# Patient Record
Sex: MALE | Race: WHITE | Employment: OTHER | URBAN - NONMETROPOLITAN AREA
[De-identification: names, ages, dates, MRNs, and addresses within clinical notes are randomized per-mention and may not be internally consistent; named-entity substitution may affect disease eponyms.]

---

## 2017-05-03 ENCOUNTER — PROCEDURE VISIT (OUTPATIENT)
Dept: PRIMARY CARE CLINIC | Age: 82
End: 2017-05-03
Payer: COMMERCIAL

## 2017-05-03 DIAGNOSIS — Z23 NEED FOR PNEUMOCOCCAL VACCINATION: Primary | ICD-10-CM

## 2017-05-03 PROCEDURE — 90471 IMMUNIZATION ADMIN: CPT | Performed by: PEDIATRICS

## 2017-05-03 PROCEDURE — 90732 PPSV23 VACC 2 YRS+ SUBQ/IM: CPT | Performed by: PEDIATRICS

## 2017-05-24 DIAGNOSIS — E11.9 TYPE 2 DIABETES MELLITUS WITHOUT COMPLICATION, UNSPECIFIED LONG TERM INSULIN USE STATUS: ICD-10-CM

## 2017-10-30 ENCOUNTER — PROCEDURE VISIT (OUTPATIENT)
Dept: PRIMARY CARE CLINIC | Age: 82
End: 2017-10-30
Payer: COMMERCIAL

## 2017-10-30 DIAGNOSIS — Z23 NEED FOR INFLUENZA VACCINATION: Primary | ICD-10-CM

## 2017-10-30 PROCEDURE — 90471 IMMUNIZATION ADMIN: CPT | Performed by: PEDIATRICS

## 2017-10-30 PROCEDURE — 90662 IIV NO PRSV INCREASED AG IM: CPT | Performed by: PEDIATRICS

## 2017-11-13 DIAGNOSIS — E78.00 PURE HYPERCHOLESTEROLEMIA: ICD-10-CM

## 2017-11-13 DIAGNOSIS — Z79.4 TYPE 2 DIABETES MELLITUS WITHOUT COMPLICATION, WITH LONG-TERM CURRENT USE OF INSULIN (HCC): ICD-10-CM

## 2017-11-13 DIAGNOSIS — E11.9 TYPE 2 DIABETES MELLITUS WITHOUT COMPLICATION, WITH LONG-TERM CURRENT USE OF INSULIN (HCC): ICD-10-CM

## 2017-11-14 ENCOUNTER — OFFICE VISIT (OUTPATIENT)
Dept: PRIMARY CARE CLINIC | Age: 82
End: 2017-11-14
Payer: COMMERCIAL

## 2017-11-14 VITALS
OXYGEN SATURATION: 98 % | DIASTOLIC BLOOD PRESSURE: 70 MMHG | HEIGHT: 71 IN | WEIGHT: 166 LBS | HEART RATE: 70 BPM | SYSTOLIC BLOOD PRESSURE: 130 MMHG | TEMPERATURE: 97.6 F | BODY MASS INDEX: 23.24 KG/M2

## 2017-11-14 DIAGNOSIS — E78.00 PURE HYPERCHOLESTEROLEMIA: ICD-10-CM

## 2017-11-14 DIAGNOSIS — I10 ESSENTIAL HYPERTENSION: ICD-10-CM

## 2017-11-14 DIAGNOSIS — I25.10 CORONARY ARTERY DISEASE INVOLVING NATIVE HEART WITHOUT ANGINA PECTORIS, UNSPECIFIED VESSEL OR LESION TYPE: ICD-10-CM

## 2017-11-14 DIAGNOSIS — Z12.5 SCREENING PSA (PROSTATE SPECIFIC ANTIGEN): ICD-10-CM

## 2017-11-14 DIAGNOSIS — E11.9 TYPE 2 DIABETES MELLITUS WITHOUT COMPLICATION, UNSPECIFIED LONG TERM INSULIN USE STATUS: Primary | ICD-10-CM

## 2017-11-14 PROCEDURE — 1123F ACP DISCUSS/DSCN MKR DOCD: CPT | Performed by: NURSE PRACTITIONER

## 2017-11-14 PROCEDURE — G8598 ASA/ANTIPLAT THER USED: HCPCS | Performed by: NURSE PRACTITIONER

## 2017-11-14 PROCEDURE — G8427 DOCREV CUR MEDS BY ELIG CLIN: HCPCS | Performed by: NURSE PRACTITIONER

## 2017-11-14 PROCEDURE — 4040F PNEUMOC VAC/ADMIN/RCVD: CPT | Performed by: NURSE PRACTITIONER

## 2017-11-14 PROCEDURE — G8420 CALC BMI NORM PARAMETERS: HCPCS | Performed by: NURSE PRACTITIONER

## 2017-11-14 PROCEDURE — G8484 FLU IMMUNIZE NO ADMIN: HCPCS | Performed by: NURSE PRACTITIONER

## 2017-11-14 PROCEDURE — 99214 OFFICE O/P EST MOD 30 MIN: CPT | Performed by: NURSE PRACTITIONER

## 2017-11-14 PROCEDURE — 1036F TOBACCO NON-USER: CPT | Performed by: NURSE PRACTITIONER

## 2017-11-14 RX ORDER — CHOLECALCIFEROL (VITAMIN D3) 125 MCG
500 CAPSULE ORAL DAILY
COMMUNITY

## 2017-11-14 RX ORDER — ATORVASTATIN CALCIUM 40 MG/1
40 TABLET, FILM COATED ORAL DAILY
Qty: 90 TABLET | Refills: 3 | Status: SHIPPED | OUTPATIENT
Start: 2017-11-14 | End: 2018-11-29 | Stop reason: SDUPTHER

## 2017-11-14 RX ORDER — CLOPIDOGREL BISULFATE 75 MG/1
TABLET ORAL
Qty: 90 TABLET | Refills: 3 | Status: SHIPPED | OUTPATIENT
Start: 2017-11-14 | End: 2018-12-13 | Stop reason: SDUPTHER

## 2017-11-14 RX ORDER — IRBESARTAN 300 MG/1
300 TABLET ORAL DAILY
Qty: 90 TABLET | Refills: 3 | Status: SHIPPED | OUTPATIENT
Start: 2017-11-14 | End: 2018-12-21 | Stop reason: SDUPTHER

## 2017-11-14 RX ORDER — METOPROLOL SUCCINATE 100 MG/1
100 TABLET, EXTENDED RELEASE ORAL DAILY
Qty: 90 TABLET | Refills: 3 | Status: SHIPPED | OUTPATIENT
Start: 2017-11-14 | End: 2018-12-13 | Stop reason: SDUPTHER

## 2017-11-14 NOTE — PROGRESS NOTES
[address]  [place], [address] [place] [address] Rd  Phone [phone]   Fax [phone]      OFFICE VISIT: 11/14/2017    Abdoul Brunson is a [de-identified] y.o. male who presents today for his medical conditions/complaints as noted below. Abdoul Brunson is c/o of Annual Exam; Medication Refill; Hyperlipidemia; Hypertension; and Diabetes        HPI:     HPI  Treatment Adherence:   Medication compliance:  compliant most of the time  Diet compliance:  compliant most of the time  Weight trend: stable  Current exercise: no regular exercise  Barriers: none    Diabetes Mellitus Type 2:     Home blood sugar records: fasting range: 100  Any episodes of hypoglycemia? no  Eye exam current (within one year): yes  Tobacco history: He  reports that he has quit smoking. He has never used smokeless tobacco.   Daily Aspirin? Yes      Hypertension:  Home blood pressure monitoring: No.  He is adherent to a low sodium diet. Patient denies chest pain. Antihypertensive medication side effects: no medication side effects noted. Use of agents associated with hypertension: none. Hyperlipidemia:  No new myalgias or GI upset on atorvastatin (Lipitor).        Lab Results   Component Value Date    LABA1C 6.1 11/07/2016    LABA1C 6.4 (H) 09/24/2015     Lab Results   Component Value Date    CREATININE 1.3 11/07/2016     Lab Results   Component Value Date    ALT 29 11/07/2016    AST 25 11/07/2016     Lab Results   Component Value Date    TRIG 116 11/07/2016    HDL 49 11/07/2016    LDLCALC 62 11/07/2016    LDLDIRECT 74 09/24/2015                 Past Medical History:   Diagnosis Date    CAD (coronary artery disease)     COPD (chronic obstructive pulmonary disease) (HCC)     Hyperlipidemia     Hypertension     Type II or unspecified type diabetes mellitus without mention of complication, not stated as uncontrolled       Past Surgical History:   Procedure Laterality Date    CATARACT REMOVAL      CORONARY ARTERY BYPASS GRAFT      CYST REMOVAL      from Cardiovascular: Negative for chest pain, palpitations and leg swelling. Gastrointestinal: Negative for abdominal pain, constipation, diarrhea, nausea and vomiting. Genitourinary: Negative for flank pain. Musculoskeletal: Negative for arthralgias, myalgias, neck pain and neck stiffness. Neurological: Negative for headaches. Psychiatric/Behavioral: Negative for decreased concentration and sleep disturbance. The patient is not nervous/anxious. Objective:     Physical Exam   Constitutional: He is oriented to person, place, and time. He appears well-developed and well-nourished. HENT:   Head: Normocephalic and atraumatic. Right Ear: External ear normal.   Left Ear: External ear normal.   Nose: Nose normal.   Mouth/Throat: Oropharynx is clear and moist.   Eyes: Conjunctivae are normal. Pupils are equal, round, and reactive to light. No scleral icterus. Neck: Normal range of motion. Neck supple. No edema present. Cardiovascular: Normal rate, regular rhythm, normal heart sounds and intact distal pulses. No murmur heard. Pulmonary/Chest: Effort normal and breath sounds normal.   Abdominal: Soft. Normal appearance and bowel sounds are normal. He exhibits no distension. There is no hepatosplenomegaly. There is no tenderness. Musculoskeletal: Normal range of motion. He exhibits no edema. Neurological: He is alert and oriented to person, place, and time. Skin: Skin is warm, dry and intact. No rash noted. Psychiatric: He has a normal mood and affect. His speech is normal and behavior is normal. Judgment and thought content normal.   Vitals reviewed. /70   Pulse 70   Temp 97.6 °F (36.4 °C) (Temporal)   Ht 5' 11\" (1.803 m)   Wt 166 lb (75.3 kg)   SpO2 98%   BMI 23.15 kg/m²     Assessment:        ICD-10-CM ICD-9-CM    1.  Type 2 diabetes mellitus without complication, unspecified long term insulin use status (HCC) E11.9 250.00 sitaGLIPtan-metformin (JANUMET)  MG per tablet CBC Auto Differential      Comprehensive Metabolic Panel      Hemoglobin A1C      Lipid Panel      TSH without Reflex      T4, Free      Microalbumin / Creatinine Urine Ratio   2. Pure hypercholesterolemia E78.00 272.0 atorvastatin (LIPITOR) 40 MG tablet   3. Screening PSA (prostate specific antigen) Z12.5 V76.44 PSA Screening   4. Essential hypertension I10 401.9 metoprolol succinate (TOPROL XL) 100 MG extended release tablet      irbesartan (AVAPRO) 300 MG tablet   5. Coronary artery disease involving native heart without angina pectoris, unspecified vessel or lesion type I25.10 414.01 clopidogrel (PLAVIX) 75 MG tablet       Plan:      Return in about 6 months (around 5/14/2018) for diabetes.     Orders Placed This Encounter   Procedures    CBC Auto Differential     Standing Status:   Future     Standing Expiration Date:   11/14/2018    Comprehensive Metabolic Panel     Standing Status:   Future     Standing Expiration Date:   11/14/2018    Hemoglobin A1C     Standing Status:   Future     Standing Expiration Date:   11/14/2018    Lipid Panel     Standing Status:   Future     Standing Expiration Date:   11/14/2018     Order Specific Question:   Is Patient Fasting?/# of Hours     Answer:   10-12 hours    TSH without Reflex     Standing Status:   Future     Standing Expiration Date:   11/14/2018    T4, Free     Standing Status:   Future     Standing Expiration Date:   11/14/2018    Microalbumin / Creatinine Urine Ratio     Standing Status:   Future     Standing Expiration Date:   11/15/2018    PSA Screening     Standing Status:   Future     Standing Expiration Date:   11/14/2018     Orders Placed This Encounter   Medications    sitaGLIPtan-metformin (JANUMET)  MG per tablet     Sig: Take 1 tablet by mouth 2 times daily (with meals)     Dispense:  180 tablet     Refill:  3    metoprolol succinate (TOPROL XL) 100 MG extended release tablet     Sig: Take 1 tablet by mouth daily     Dispense:  90

## 2017-11-16 ASSESSMENT — ENCOUNTER SYMPTOMS
DIARRHEA: 0
SHORTNESS OF BREATH: 0
NAUSEA: 0
ABDOMINAL PAIN: 0
COUGH: 0
VOMITING: 0
RHINORRHEA: 0
SORE THROAT: 0
TROUBLE SWALLOWING: 0
CONSTIPATION: 0

## 2017-12-05 ENCOUNTER — TELEPHONE (OUTPATIENT)
Dept: PRIMARY CARE CLINIC | Age: 82
End: 2017-12-05

## 2017-12-05 NOTE — TELEPHONE ENCOUNTER
Wife called, he had labs done last month and never got the results from [facility]. Called Stefani and she is going to fax them over.

## 2017-12-07 ENCOUNTER — TELEPHONE (OUTPATIENT)
Dept: PRIMARY CARE CLINIC | Age: 82
End: 2017-12-07

## 2017-12-07 LAB
ALBUMIN SERPL-MCNC: 3.9 G/DL
ALP BLD-CCNC: 73 U/L
ALT SERPL-CCNC: 23 U/L
ANION GAP SERPL CALCULATED.3IONS-SCNC: 10 MMOL/L
AST SERPL-CCNC: 16 U/L
AVERAGE GLUCOSE: 137
BASOPHILS ABSOLUTE: 0.1 /ΜL
BASOPHILS RELATIVE PERCENT: 0.7 %
BILIRUB SERPL-MCNC: 0.4 MG/DL (ref 0.1–1.4)
BUN BLDV-MCNC: 17 MG/DL
CALCIUM SERPL-MCNC: 8.9 MG/DL
CHLORIDE BLD-SCNC: 103 MMOL/L
CHOLESTEROL, TOTAL: 144 MG/DL
CHOLESTEROL/HDL RATIO: 1
CO2: 26.3 MMOL/L
CREAT SERPL-MCNC: 1.3 MG/DL
EOSINOPHILS ABSOLUTE: 0.3 /ΜL
EOSINOPHILS RELATIVE PERCENT: 3.7 %
GFR CALCULATED: 53
GLUCOSE BLD-MCNC: 119 MG/DL
HBA1C MFR BLD: 6.4 %
HCT VFR BLD CALC: 45.4 % (ref 41–53)
HDLC SERPL-MCNC: 45 MG/DL (ref 35–70)
HEMOGLOBIN: 15 G/DL (ref 13.5–17.5)
LDL CHOLESTEROL CALCULATED: 69 MG/DL (ref 0–160)
LYMPHOCYTES ABSOLUTE: 1.7 /ΜL
LYMPHOCYTES RELATIVE PERCENT: 19.3 %
MCH RBC QN AUTO: 33 PG
MCHC RBC AUTO-ENTMCNC: 33 G/DL
MCV RBC AUTO: 99.8 FL
MONOCYTES ABSOLUTE: 0.7 /ΜL
MONOCYTES RELATIVE PERCENT: 8.2 %
NEUTROPHILS ABSOLUTE: 5.9 /ΜL
NEUTROPHILS RELATIVE PERCENT: 68.1 %
PDW BLD-RTO: 12.3 %
PLATELET # BLD: 187 K/ΜL
PMV BLD AUTO: 10.5 FL
POTASSIUM SERPL-SCNC: 4.4 MMOL/L
PROSTATE SPECIFIC ANTIGEN: 0.9 NG/ML
RBC # BLD: 4.55 10^6/ΜL
SODIUM BLD-SCNC: 139 MMOL/L
T4 FREE: 1.01
TOTAL PROTEIN: 7.4
TRIGL SERPL-MCNC: 152 MG/DL
TSH SERPL DL<=0.05 MIU/L-ACNC: 1.78 UIU/ML
VLDLC SERPL CALC-MCNC: 30 MG/DL
WBC # BLD: 8.7 10^3/ML

## 2017-12-11 ENCOUNTER — TELEPHONE (OUTPATIENT)
Dept: PRIMARY CARE CLINIC | Age: 82
End: 2017-12-11

## 2017-12-11 NOTE — TELEPHONE ENCOUNTER
----- Message from J LUIS Boles sent at 12/8/2017  7:51 AM CST -----  Hemoglobin A1c, three-month diabetes blood sugar average, is 6.4. This means on average her blood sugar has been 137 over the previous 3 months. Overall good control of blood sugar. Continue current regimen. Cholesterol: Within normal limits. Continue Lipitor  PSA, prostate antigen, is within normal limits.  Recommend repeat PSA in 1 year unless symptoms arise

## 2018-01-04 ENCOUNTER — OFFICE VISIT (OUTPATIENT)
Dept: PRIMARY CARE CLINIC | Age: 83
End: 2018-01-04
Payer: COMMERCIAL

## 2018-01-04 VITALS
HEIGHT: 72 IN | OXYGEN SATURATION: 97 % | WEIGHT: 162 LBS | SYSTOLIC BLOOD PRESSURE: 138 MMHG | BODY MASS INDEX: 21.94 KG/M2 | HEART RATE: 80 BPM | DIASTOLIC BLOOD PRESSURE: 88 MMHG | TEMPERATURE: 97.1 F

## 2018-01-04 DIAGNOSIS — F03.90 DEMENTIA WITHOUT BEHAVIORAL DISTURBANCE, UNSPECIFIED DEMENTIA TYPE: Primary | ICD-10-CM

## 2018-01-04 PROCEDURE — 99213 OFFICE O/P EST LOW 20 MIN: CPT | Performed by: NURSE PRACTITIONER

## 2018-01-04 PROCEDURE — 1036F TOBACCO NON-USER: CPT | Performed by: NURSE PRACTITIONER

## 2018-01-04 PROCEDURE — G8420 CALC BMI NORM PARAMETERS: HCPCS | Performed by: NURSE PRACTITIONER

## 2018-01-04 PROCEDURE — G8484 FLU IMMUNIZE NO ADMIN: HCPCS | Performed by: NURSE PRACTITIONER

## 2018-01-04 PROCEDURE — G8598 ASA/ANTIPLAT THER USED: HCPCS | Performed by: NURSE PRACTITIONER

## 2018-01-04 PROCEDURE — G8427 DOCREV CUR MEDS BY ELIG CLIN: HCPCS | Performed by: NURSE PRACTITIONER

## 2018-01-04 PROCEDURE — 4040F PNEUMOC VAC/ADMIN/RCVD: CPT | Performed by: NURSE PRACTITIONER

## 2018-01-04 PROCEDURE — 1123F ACP DISCUSS/DSCN MKR DOCD: CPT | Performed by: NURSE PRACTITIONER

## 2018-01-04 RX ORDER — DONEPEZIL HYDROCHLORIDE 5 MG/1
5 TABLET, FILM COATED ORAL NIGHTLY
Qty: 30 TABLET | Refills: 5 | Status: SHIPPED | OUTPATIENT
Start: 2018-01-04 | End: 2018-01-10 | Stop reason: SINTOL

## 2018-01-04 ASSESSMENT — ENCOUNTER SYMPTOMS
ABDOMINAL PAIN: 0
TROUBLE SWALLOWING: 0
RHINORRHEA: 0
NAUSEA: 0
DIARRHEA: 0
VOMITING: 0
COUGH: 0
CONSTIPATION: 0
SHORTNESS OF BREATH: 0
SORE THROAT: 0

## 2018-01-04 NOTE — PROGRESS NOTES
are in the chart. Prior to Visit Medications    Medication Sig Taking? Authorizing Provider   donepezil (ARICEPT) 5 MG tablet Take 1 tablet by mouth nightly Yes J LUIS Graves   vitamin B-12 (CYANOCOBALAMIN) 500 MCG tablet Take 500 mcg by mouth daily Yes Historical Provider, MD   sitaGLIPtan-metformin (JANUMET)  MG per tablet Take 1 tablet by mouth 2 times daily (with meals) Yes J LUIS Smiley   metoprolol succinate (TOPROL XL) 100 MG extended release tablet Take 1 tablet by mouth daily Yes J LUIS Gillespie   irbesartan (AVAPRO) 300 MG tablet Take 1 tablet by mouth daily Yes J LUIS Gillespie   clopidogrel (PLAVIX) 75 MG tablet Take one tablet by mouth every day **Please call 30 days in advance for refill** Yes J LUIS Gillespie   atorvastatin (LIPITOR) 40 MG tablet Take 1 tablet by mouth daily Yes J LUIS Gillespie   glucose blood VI test strips (ASCENSIA AUTODISC VI;ONE TOUCH ULTRA TEST VI) strip 1 each by In Vitro route daily As needed. Yes J LUIS Claros   Glucosamine-Chondroitin (GLUCOSAMINE CHONDR COMPLEX PO) Take 1 tablet by mouth daily Yes Historical Provider, MD   Multiple Vitamins-Minerals (THERAPEUTIC MULTIVITAMIN-MINERALS) tablet Take 1 tablet by mouth daily. Yes Historical Provider, MD       Allergies: Review of patient's allergies indicates no known allergies.     Past Medical History:   Diagnosis Date    CAD (coronary artery disease)     COPD [facility]chronic obstructive pulmonary disease) ([facility] [address]     Hyperlipidemia     Hypertension     Type II or unspecified type diabetes mellitus without mention of complication, not stated as uncontrolled        Past Surgical History:   Procedure Laterality Date    CATARACT REMOVAL      CORONARY ARTERY BYPASS GRAFT      CYST REMOVAL      from face    HERNIA REPAIR      KIDNEY STONE SURGERY      TYMPANOSTOMY TUBE PLACEMENT         Social History   Substance Use Topics    Smoking status: Former Smoker  Smokeless tobacco: Never Used    Alcohol use No       Review of Systems   Constitutional: Negative for activity change, appetite change, fatigue, fever and unexpected weight change. HENT: Negative for ear pain, rhinorrhea, sore throat and trouble swallowing. Eyes: Negative for visual disturbance. Respiratory: Negative for cough and shortness of breath. Cardiovascular: Negative for chest pain, palpitations and leg swelling. Gastrointestinal: Negative for abdominal pain, constipation, diarrhea, nausea and vomiting. Genitourinary: Negative for flank pain. Musculoskeletal: Negative for arthralgias, myalgias, neck pain and neck stiffness. Neurological: Negative for headaches. Psychiatric/Behavioral: Negative for decreased concentration and sleep disturbance. The patient is not nervous/anxious. Physical Exam   Constitutional: He is oriented to person, place, and time. He appears well-developed and well-nourished. HENT:   Head: Normocephalic and atraumatic. Right Ear: External ear normal.   Left Ear: External ear normal.   Nose: Nose normal.   Mouth/Throat: Oropharynx is clear and moist.   Eyes: No scleral icterus. Neck: Normal range of motion. Neck supple. No edema present. Cardiovascular: Normal rate, regular rhythm, normal heart sounds and intact distal pulses. No murmur heard. Pulmonary/Chest: Effort normal and breath sounds normal.   Abdominal: Soft. Normal appearance and bowel sounds are normal. He exhibits no distension. There is no hepatosplenomegaly. There is no tenderness. Musculoskeletal: Normal range of motion. He exhibits no edema. Neurological: He is alert and oriented to person, place, and time. Skin: Skin is warm, dry and intact. No rash noted. Psychiatric: He has a normal mood and affect. His speech is normal and behavior is normal. Judgment and thought content normal.   Vitals reviewed. ASSESSMENT      ICD-10-CM ICD-9-CM    1.  Dementia without behavioral disturbance, unspecified dementia type F03.90 294.20 donepezil (ARICEPT) 5 MG tablet         PLAN  1. Dementia without behavioral disturbance, unspecified dementia type    - donepezil (ARICEPT) 5 MG tablet; Take 1 tablet by mouth nightly  Dispense: 30 tablet; Refill: 5      No orders of the defined types were placed in this encounter. Return in about 8 weeks (around 3/1/2018) for dementia follow up. Patient Instructions   Exercise 30 minutes a day (can be in 10 minute intervals)          Controlled Substances Monitoring: Additional Instructions: As always, patient is advised to bring in medication bottles in order to correctly reconcile with our current list.    Blakemary Karely received counseling on the following healthy behaviors: medication adherence    Patient given educational materials on dx    I have instructed Blakemary Karely to complete a self tracking handout on n/a and instructed them to bring it with them to his next appointment. Discussed use, benefit, and side effects of prescribed medications. Barriers to medication compliance addressed. All patient questions answered. Pt voiced understanding.      J LUIS Segundo

## 2018-01-09 ENCOUNTER — TELEPHONE (OUTPATIENT)
Dept: PRIMARY CARE CLINIC | Age: 83
End: 2018-01-09

## 2018-01-10 RX ORDER — MEMANTINE HYDROCHLORIDE 5 MG/1
5 TABLET ORAL DAILY
Qty: 30 TABLET | Refills: 3 | Status: SHIPPED | OUTPATIENT
Start: 2018-01-10 | End: 2018-03-09 | Stop reason: SDUPTHER

## 2018-03-09 ENCOUNTER — OFFICE VISIT (OUTPATIENT)
Dept: PRIMARY CARE CLINIC | Age: 83
End: 2018-03-09
Payer: COMMERCIAL

## 2018-03-09 VITALS
DIASTOLIC BLOOD PRESSURE: 80 MMHG | SYSTOLIC BLOOD PRESSURE: 130 MMHG | HEART RATE: 69 BPM | WEIGHT: 156 LBS | TEMPERATURE: 98.1 F | BODY MASS INDEX: 20.02 KG/M2 | HEIGHT: 74 IN | OXYGEN SATURATION: 94 %

## 2018-03-09 DIAGNOSIS — F03.90 DEMENTIA WITHOUT BEHAVIORAL DISTURBANCE, UNSPECIFIED DEMENTIA TYPE: Primary | ICD-10-CM

## 2018-03-09 DIAGNOSIS — E11.9 TYPE 2 DIABETES MELLITUS WITHOUT COMPLICATION, WITHOUT LONG-TERM CURRENT USE OF INSULIN (HCC): ICD-10-CM

## 2018-03-09 PROCEDURE — 4040F PNEUMOC VAC/ADMIN/RCVD: CPT | Performed by: NURSE PRACTITIONER

## 2018-03-09 PROCEDURE — G8420 CALC BMI NORM PARAMETERS: HCPCS | Performed by: NURSE PRACTITIONER

## 2018-03-09 PROCEDURE — 1123F ACP DISCUSS/DSCN MKR DOCD: CPT | Performed by: NURSE PRACTITIONER

## 2018-03-09 PROCEDURE — 1036F TOBACCO NON-USER: CPT | Performed by: NURSE PRACTITIONER

## 2018-03-09 PROCEDURE — G8427 DOCREV CUR MEDS BY ELIG CLIN: HCPCS | Performed by: NURSE PRACTITIONER

## 2018-03-09 PROCEDURE — G8598 ASA/ANTIPLAT THER USED: HCPCS | Performed by: NURSE PRACTITIONER

## 2018-03-09 PROCEDURE — G8482 FLU IMMUNIZE ORDER/ADMIN: HCPCS | Performed by: NURSE PRACTITIONER

## 2018-03-09 PROCEDURE — 99213 OFFICE O/P EST LOW 20 MIN: CPT | Performed by: NURSE PRACTITIONER

## 2018-03-09 RX ORDER — MEMANTINE HYDROCHLORIDE 5 MG/1
5 TABLET ORAL 2 TIMES DAILY
Qty: 60 TABLET | Refills: 3 | Status: SHIPPED | OUTPATIENT
Start: 2018-03-09 | End: 2018-07-17 | Stop reason: SDUPTHER

## 2018-03-09 ASSESSMENT — ENCOUNTER SYMPTOMS
CONSTIPATION: 0
COUGH: 0
RHINORRHEA: 0
EYE REDNESS: 0
DIARRHEA: 0
VOMITING: 0
SHORTNESS OF BREATH: 0
SORE THROAT: 0

## 2018-03-09 NOTE — PROGRESS NOTES
liquids. Rinse the empty oral syringe with clean water and allow it to air dry after every use. Use memantine regularly to get the most benefit. Get your prescription refilled before you run out of medicine completely. Your doctor will need to check your progress while you are using memantine. Store memantine at room temperature away from moisture and heat. Keep the liquid medicine bottle tightly closed with the cap provided. Do not store the bottle with the oral syringe in it. Read all patient information, medication guides, and instruction sheets provided to you. Ask your doctor or pharmacist if you have any questions. What happens if I miss a dose? Take the missed dose as soon as you remember. Skip the missed dose if it is almost time for your next scheduled dose. Do not take extra medicine to make up the missed dose. If you miss doses or forget to take your medicine for several days, call your doctor before starting the medicine again. What happens if I overdose? Seek emergency medical attention or call the Poison Help line at 1-172.666.6669. What should I avoid while taking memantine? Memantine can cause side effects that may impair your thinking or reactions. Be careful if you drive or do anything that requires you to be awake and alert. What are the possible side effects of memantine? Get emergency medical help if you have signs of an allergic reaction: hives; difficulty breathing; swelling of your face, lips, tongue, or throat. Call your doctor at once if you have:  · severe headache, blurred vision, pounding in your neck or ears;  · seizure (convulsions); or  · unusual changes in mood or behavior. Common side effects may include:  · diarrhea;  · dizziness; or  · headache. This is not a complete list of side effects and others may occur. Call your doctor for medical advice about side effects. You may report side effects to FDA at 1-847-GOX-2106. What other drugs will affect memantine?   Tell your doctor about all your current medicines and any you start or stop using, especially:  · amantadine;  · zonisamide;  · cough medicine that contains dextromethorphan (Delsym, Robitussin Maximum Strength, Vicks 44, and others);  · medicines to make the urine alkaline --urine sodium bicarbonate, potassium citrate (K-Lyte, Urocit-K), sodium citrate and citric acid (Bicitra, Oracit), or sodium citrate and potassium (Citrolith, Polycitra); or  · medicine to treat glaucoma or increased pressure inside the eyes --acetazolamide, methazolamide. This list is not complete. Other drugs may interact with memantine, including prescription and over-the-counter medicines, vitamins, and herbal products. Not all possible interactions are listed in this medication guide. Where can I get more information? Your pharmacist can provide more information about memantine. Remember, keep this and all other medicines out of the reach of children, never share your medicines with others, and use this medication only for the indication prescribed. Every effort has been made to ensure that the information provided by [address] is accurate, up-to-date, and complete, but no guarantee is made to that effect. Drug information contained herein may be time sensitive. [facility] information has been compiled for use by healthcare practitioners and consumers in the [place] and therefore [employer] does not warrant that uses outside of the [place] are appropriate, unless specifically indicated otherwise. [facility]'s drug information does not endorse drugs, diagnose patients or recommend therapy. [facility] drug information is an informational resource designed to assist licensed healthcare practitioners in caring for their patients and/or to serve consumers viewing this service as a supplement to, and not a substitute for, the expertise, skill, knowledge and judgment of healthcare practitioners.  The absence of a warning for a given drug or drug combination in no way should be construed to indicate that the drug or drug combination is safe, effective or appropriate for any given patient. [facility] does not assume any responsibility for any aspect of healthcare administered with the aid of information [facility] provides. The information contained herein is not intended to cover all possible uses, directions, precautions, warnings, drug interactions, allergic reactions, or adverse effects. If you have questions about the drugs you are taking, check with your doctor, nurse or pharmacist.  Copyright 5976-6745 [address]. Version: 3.01. Revision date: 2/11/2015. Care instructions adapted under license by [facility] ([facility]). If you have questions about a medical condition or this instruction, always ask your healthcare professional. [place] [address] any warranty or liability for your use of this information. Controlled Substances Monitoring:   n/a          Additional Instructions: As always, patient is advised to bring in medication bottles in order to correctly reconcile with our current list.    Javier Castillo received counseling on the following healthy behaviors: n/a    Patient given educational materials on dx    I have instructed Javier Castillo to complete a self tracking handout on n/a and instructed them to bring it with them to his next appointment. Discussed use, benefit, and side effects of prescribed medications. Barriers to medication compliance addressed. All patient questions answered. Pt voiced understanding.      J LUIS Bolaños

## 2018-03-09 NOTE — PATIENT INSTRUCTIONS
Patient Education          memantine  Pronunciation:  ap MAN teen  Brand:  Namenda, Namendmary ROCHA  What is the most important information I should know about memantine? Follow all directions on your medicine label and package. Tell each of your healthcare providers about all your medical conditions, allergies, and all medicines you use. What is memantine? Memantine reduces the actions of chemicals in the brain that may contribute to the symptoms of Alzheimer's disease. Memantine is used to treat moderate to severe dementia of the Alzheimer's type. Memantine may also be used for purposes not listed in this medication guide. What should I discuss with my healthcare provider before taking memantine? You should not use memantine if you are allergic to it. To make sure memantine is safe for you, tell your doctor if you have:  · epilepsy or other seizure disorder;  · liver disease;  · kidney disease;  · urination problems; or  · a bladder or kidney infection. This medicine is not expected to harm an unborn baby. Tell your doctor if you are pregnant or plan to become pregnant. It is not known whether memantine passes into breast milk or if it could harm a nursing baby. Tell your doctor if you are breast-feeding a baby. How should I take memantine? Follow all directions on your prescription label. Your doctor may occasionally change your dose to make sure you get the best results. Do not take this medicine in larger or smaller amounts or for longer than recommended. Memantine can be taken with or without food. Do not crush, chew, break, or open an extended-release capsule. Swallow it whole. To make swallowing easier, you may open the extended-release capsule and sprinkle the medicine into a spoonful of applesauce. Swallow right away without chewing. Do not save the mixture for later use.   Measure liquid medicine (oral solution) with the dosing syringe provided, or with a special dose-measuring spoon or medical advice about side effects. You may report side effects to FDA at 7-638-FDA-8534. What other drugs will affect memantine? Tell your doctor about all your current medicines and any you start or stop using, especially:  · amantadine;  · zonisamide;  · cough medicine that contains dextromethorphan (Delsym, Robitussin Maximum Strength, Vicks 44, and others);  · medicines to make the urine alkaline --urine sodium bicarbonate, potassium citrate (K-Lyte, Urocit-K), sodium citrate and citric acid (Bicitra, Oracit), or sodium citrate and potassium (Citrolith, Polycitra); or  · medicine to treat glaucoma or increased pressure inside the eyes --acetazolamide, methazolamide. This list is not complete. Other drugs may interact with memantine, including prescription and over-the-counter medicines, vitamins, and herbal products. Not all possible interactions are listed in this medication guide. Where can I get more information? Your pharmacist can provide more information about memantine. Remember, keep this and all other medicines out of the reach of children, never share your medicines with others, and use this medication only for the indication prescribed. Every effort has been made to ensure that the information provided by [address] is accurate, up-to-date, and complete, but no guarantee is made to that effect. Drug information contained herein may be time sensitive. [employer] information has been compiled for use by healthcare practitioners and consumers in the [place] and therefore [employer] does not warrant that uses outside of the [place] are appropriate, unless specifically indicated otherwise. [facility] drug information does not endorse drugs, diagnose patients or recommend therapy.  [employer] drug information is an informational resource designed to assist licensed healthcare practitioners in caring for their patients and/or to serve consumers viewing this service as a supplement to, and not a substitute for, the expertise, skill, knowledge and judgment of healthcare practitioners. The absence of a warning for a given drug or drug combination in no way should be construed to indicate that the drug or drug combination is safe, effective or appropriate for any given patient. [facility] does not assume any responsibility for any aspect of healthcare administered with the aid of information [facility] provides. The information contained herein is not intended to cover all possible uses, directions, precautions, warnings, drug interactions, allergic reactions, or adverse effects. If you have questions about the drugs you are taking, check with your doctor, nurse or pharmacist.  Copyright 7136-4663 [address]. Version: 3.01. Revision date: 2/11/2015. Care instructions adapted under license by [facility] ([facility]). If you have questions about a medical condition or this instruction, always ask your healthcare professional. [place] [address] any warranty or liability for your use of this information.

## 2018-03-12 ENCOUNTER — OFFICE VISIT (OUTPATIENT)
Dept: CARDIOLOGY | Age: 83
End: 2018-03-12
Payer: COMMERCIAL

## 2018-03-12 VITALS
WEIGHT: 159 LBS | DIASTOLIC BLOOD PRESSURE: 80 MMHG | BODY MASS INDEX: 20.41 KG/M2 | HEART RATE: 66 BPM | SYSTOLIC BLOOD PRESSURE: 140 MMHG | HEIGHT: 74 IN

## 2018-03-12 DIAGNOSIS — I35.1 NONRHEUMATIC AORTIC VALVE INSUFFICIENCY: ICD-10-CM

## 2018-03-12 DIAGNOSIS — E78.00 PURE HYPERCHOLESTEROLEMIA: ICD-10-CM

## 2018-03-12 DIAGNOSIS — I25.10 CORONARY ARTERY DISEASE INVOLVING NATIVE CORONARY ARTERY OF NATIVE HEART WITHOUT ANGINA PECTORIS: Primary | ICD-10-CM

## 2018-03-12 DIAGNOSIS — R06.09 DOE (DYSPNEA ON EXERTION): ICD-10-CM

## 2018-03-12 DIAGNOSIS — I35.1 AORTIC VALVE INSUFFICIENCY, ETIOLOGY OF CARDIAC VALVE DISEASE UNSPECIFIED: ICD-10-CM

## 2018-03-12 DIAGNOSIS — I10 ESSENTIAL HYPERTENSION: ICD-10-CM

## 2018-03-12 PROCEDURE — 1123F ACP DISCUSS/DSCN MKR DOCD: CPT | Performed by: NURSE PRACTITIONER

## 2018-03-12 PROCEDURE — G8420 CALC BMI NORM PARAMETERS: HCPCS | Performed by: NURSE PRACTITIONER

## 2018-03-12 PROCEDURE — G8482 FLU IMMUNIZE ORDER/ADMIN: HCPCS | Performed by: NURSE PRACTITIONER

## 2018-03-12 PROCEDURE — 4040F PNEUMOC VAC/ADMIN/RCVD: CPT | Performed by: NURSE PRACTITIONER

## 2018-03-12 PROCEDURE — G8427 DOCREV CUR MEDS BY ELIG CLIN: HCPCS | Performed by: NURSE PRACTITIONER

## 2018-03-12 PROCEDURE — 93000 ELECTROCARDIOGRAM COMPLETE: CPT | Performed by: NURSE PRACTITIONER

## 2018-03-12 PROCEDURE — 99203 OFFICE O/P NEW LOW 30 MIN: CPT | Performed by: NURSE PRACTITIONER

## 2018-03-12 PROCEDURE — 1036F TOBACCO NON-USER: CPT | Performed by: NURSE PRACTITIONER

## 2018-03-12 PROCEDURE — G8598 ASA/ANTIPLAT THER USED: HCPCS | Performed by: NURSE PRACTITIONER

## 2018-03-12 RX ORDER — ASPIRIN 81 MG/1
81 TABLET ORAL DAILY
Qty: 30 TABLET | Refills: 3 | Status: SHIPPED | OUTPATIENT
Start: 2018-03-12

## 2018-03-12 NOTE — PATIENT INSTRUCTIONS
may be done to determine the severity of your disease. The doctor will ask about your symptoms and medical history. A physical exam will be done. Tests may include:   Blood tests to look for certain substances in the blood called troponins which help the doctor determine if you are having a heart attack   Electrocardiogram (ECG, EKG) records the heart's activity by measuring electrical currents through the heart muscle, and can reveal evidence of past heart attacks, acute heart attacks, and heart rhythm problems   Echocardiogram uses high-frequency sound waves (ultrasound) to examine the size, shape, and motion of the heart, giving information about the structure and function of the heart   Exercise stress test records the heart's electrical activity during increased physical activity   Nuclear stress test the heart is observed while exercising and radioactive material highlights impaired blood flow to help locate problem areas   Coronary calcium scoring a type of x-ray called a CAT scan that uses a computer to look for the presence of calcium in the heart arteries   Coronary angiography x-rays taken after a dye is injected into the arteries to allows the doctor to look for abnormalities in the arteries   Treatment   Treatment may include:   Nitroglycerin   This medicine is usually given during an attack of angina. It can be given as a tablet that dissolves under the tongue or as a spray. Longer-lasting types can be used to prevent angina before an activity known to cause it. These may be given as pills or applied as patches or ointments. Blood-Thinning Medications   A small, daily dose of aspirin has been shown to decrease the risk of heart attack. Ask your doctor before taking aspirin daily. Warfarin (Coumadin)   Ticlopidine (Ticlid)   Clopidogrel (Plavix)   Beta-Blockers, Calcium-Channel Blockers, and ACE-Inhibitors   These may help prevent angina. In some cases, they may lower the risk of heart attack. Medications to Lower Cholesterol   Medicines, like statins, are often prescribed to people who have CAD. Statins (eg, atorvastatin [Lipitor]) lower cholesterol levels, which can help to prevent CAD events. Revascularization   Patients with severe blockages in their coronary arteries may benefit from procedures to immediately improve blood flow to the heart muscle:   Percutaneous coronary interventions (PCI)such as balloon angioplasty , in some cases, a wire mesh stent is placed to hold the artery open   Coronary artery bypass grafting (CABG) segments of vessels are taken from other areas of the body and are sewn into the heart arteries to reroute blood flow around blockages   Some studies have shown that CABG may be more effective than PCI. Lifestyle changes and intensive medicine may also be just as effective as PCI. Options for Refractory Angina   For patients who are not candidates for revascularization procedures but have continued angina despite medicine, options include:   Enhanced external counterpulsation (EECP) large air bags are inflated around the legs in tune with the heart beat. The patient receives 5 one-hour treatments per week for seven weeks. This has been shown to reduce angina and may improve symptom-free exercise duration. Transmyocardial revascularization (TMR) surgical procedure done with laser to reduce chest pain. Researchers are also studying gene therapy as a possible treatment. Prevention   To reduce your risk of getting coronary artery disease:   Maintain a healthy weight. Eat a heart healthy diet that is low in saturated fat , red meat and processed meats, and rich in whole grain , fruits, and vegetables . Begin a safe exercise program with the advice of your doctor. If you smoke, quit . Treat your high blood pressure and/or diabetes. Treat high cholesterol or triglycerides. Ask your doctor about taking a low-dose aspirin every day.    In certain patients, taking increase your risk of atherosclerosis . This is a dangerous hardening of the arteries. It can end up blocking blood flow. In some cases, this may result in:   Angina   Heart attack   Stroke   Other serious complications   Blood Vessel with Atherosclerosis       2011 [facility].   Diagnosis   This condition is diagnosed with blood tests. These tests measure the levels of lipids in the blood. The National Cholesterol Education Program advises that you have your lipids checked at least once every five years, starting at age 21. Also, the American Academy of Pediatrics recommends lipid screening for children at risk (eg, a family history of hyperlipidemia). Testing may consist of a fasting blood test for:   Total cholesterol   LDL (bad cholesterol)   HDL (good cholesterol)   Triglycerides   Your doctor may recommend more frequent or earlier testing if you have:   Family history of hyperlipidemia   Risk factor or disease that may cause hyperlipidemia   Complication that may result from hyperlipidemia   Treatment   Treatment is not only aimed at correcting your cholesterol levels, but also at lowering your overall risk for heart disease and strokes. Diet Changes   Eat a diet low in total fat, saturated fat, and cholesterol . Reduce or eliminate the amount of alcohol you drink. Eat more high-fiber foods. Lifestyle Changes   If you are overweight, lose weight . If you smoke, quit . Exercise regularly . Talk to you doctor before starting an exercise program. Hanane Jean may already have hardening of the arteries or heart disease. These conditions increase your risk of having a heart attack while exercising. Make sure other medical conditions such as high blood pressure and diabetes are being treated and controlled. Medications   There are a number of drugs available, such as statins , to treat this condition and help lower your risk for heart disease. Talk to your doctor.  Statins have been shown to make some cholesterol. It is also found in animal products, with the highest amounts in fatty meat, egg yolks, whole milk, cheese, shellfish, and organ meats. On a heart-healthy diet, you should limit your cholesterol intake to less than 200 mg per day. It is normal and important to have some cholesterol in your bloodstream. But too much cholesterol can cause plaque to build up within your arteries, which can eventually lead to a heart attack or stroke. The two types of cholesterol that are most commonly referred to are:   · Low-density lipoprotein (LDL) cholesterol Also known as bad cholesterol, this is the cholesterol that tends to build up along your arteries. Bad cholesterol levels are increased by eating fats that are saturated or hydrogenated. Optimal level of this cholesterol is less than 100. Over 130 starts to get risky for heart disease. · High-density lipoprotein (HDL) cholesterol Also known as good cholesterol, this type of cholesterol actually carries cholesterol away from your arteries and may, therefore, help lower your risk of having a heart attack. You want this level to be high (ideally greater than 60). It is a risk to have a level less than 40. You can raise this good cholesterol by eating olive oil, canola oil, avocados, or nuts. Exercise raises this level, too. Fat   Fat is calorie dense and packs a lot of calories into a small amount of food. Even though fats should be limited due to their high calorie content, not all fats are bad. In fact, some fats are quite healthful. Fat can be broken down into four main types.    · The good-for-you fats are:   ¨ Monounsaturated fat found in oils such as olive and canola, avocados, and nuts and natural nut butters; can decrease cholesterol levels, while keeping levels of HDL cholesterol high   ¨ Polyunsaturated fat found in oils such as safflower, sunflower, soybean, corn, and sesame; can decrease total cholesterol and LDL cholesterol   ¨ Omega-3 olives Meat tenderizers, seasoning salt, and most flavored vinegars   Beverages   Low-sodium carbonated beverages Tea and coffee in moderation Soy milk   Commercially softened water   Suggestions   · Make whole grains, fruits, and vegetables the base of your diet. · Choose heart-healthy fats such as canola, olive, and flaxseed oil, and foods high in heart-healthy fats, such as nuts, seeds, soybeans, tofu, and fish. · Eat fish at least twice per week; the fish highest in omega-3 fatty acids and lowest in mercury include salmon, herring, mackerel, sardines, and canned chunk light tuna. If you eat fish less than twice per week or have high triglycerides, talk to your doctor about taking fish oil supplements. · Read food labels. ¨ For products low in fat and cholesterol, look for fat free, low-fat, cholesterol free, saturated fat free, and trans fat freeAlso scan the Nutrition Facts Label, which lists saturated fat, trans fat, and cholesterol amounts. ¨ For products low in sodium, look for sodium free, very low sodium, low sodium, no added salt, and unsalted   · Skip the salt when cooking or at the table; if food needs more flavor, get creative and try out different herbs and spices. Garlic and onion also add substantial flavor to foods. · Trim any visible fat off meat and poultry before cooking, and drain the fat off after padilla. · Use cooking methods that require little or no added fat, such as grilling, boiling, baking, poaching, broiling, roasting, steaming, stir-frying, and sauting. · Avoid fast food and convenience food. They tend to be high in saturated and trans fat and have a lot of added salt. · Talk to a registered dietitian for individualized diet advice.      Last Reviewed: March 2011 Gina Dougherty MS, MPH, RD   Updated: 3/29/2011

## 2018-05-07 ENCOUNTER — HOSPITAL ENCOUNTER (OUTPATIENT)
Dept: NON INVASIVE DIAGNOSTICS | Age: 83
Discharge: HOME | End: 2018-05-07
Payer: COMMERCIAL

## 2018-05-07 DIAGNOSIS — I25.10 CORONARY ARTERY DISEASE INVOLVING NATIVE CORONARY ARTERY OF NATIVE HEART WITHOUT ANGINA PECTORIS: ICD-10-CM

## 2018-05-07 DIAGNOSIS — R06.09 DOE (DYSPNEA ON EXERTION): ICD-10-CM

## 2018-05-07 DIAGNOSIS — I35.1 AORTIC VALVE INSUFFICIENCY, ETIOLOGY OF CARDIAC VALVE DISEASE UNSPECIFIED: ICD-10-CM

## 2018-05-07 LAB
LV EF: 50 %
LVEF MODALITY: NORMAL

## 2018-05-07 PROCEDURE — 93306 TTE W/DOPPLER COMPLETE: CPT

## 2018-05-14 ENCOUNTER — OFFICE VISIT (OUTPATIENT)
Dept: PRIMARY CARE CLINIC | Age: 83
End: 2018-05-14
Payer: COMMERCIAL

## 2018-05-14 ENCOUNTER — OFFICE VISIT (OUTPATIENT)
Dept: CARDIOLOGY | Age: 83
End: 2018-05-14
Payer: COMMERCIAL

## 2018-05-14 VITALS
WEIGHT: 158 LBS | BODY MASS INDEX: 20.28 KG/M2 | SYSTOLIC BLOOD PRESSURE: 132 MMHG | DIASTOLIC BLOOD PRESSURE: 62 MMHG | HEART RATE: 92 BPM | HEIGHT: 74 IN

## 2018-05-14 VITALS
TEMPERATURE: 97.4 F | HEART RATE: 99 BPM | HEIGHT: 74 IN | BODY MASS INDEX: 20.28 KG/M2 | WEIGHT: 158 LBS | OXYGEN SATURATION: 93 %

## 2018-05-14 DIAGNOSIS — I25.10 CORONARY ARTERY DISEASE INVOLVING NATIVE CORONARY ARTERY OF NATIVE HEART WITHOUT ANGINA PECTORIS: ICD-10-CM

## 2018-05-14 DIAGNOSIS — I25.10 CORONARY ARTERY DISEASE INVOLVING NATIVE CORONARY ARTERY OF NATIVE HEART WITHOUT ANGINA PECTORIS: Primary | ICD-10-CM

## 2018-05-14 DIAGNOSIS — F03.90 DEMENTIA WITHOUT BEHAVIORAL DISTURBANCE, UNSPECIFIED DEMENTIA TYPE: ICD-10-CM

## 2018-05-14 DIAGNOSIS — E11.9 TYPE 2 DIABETES MELLITUS WITHOUT COMPLICATION, WITHOUT LONG-TERM CURRENT USE OF INSULIN (HCC): Primary | ICD-10-CM

## 2018-05-14 DIAGNOSIS — I10 ESSENTIAL HYPERTENSION: ICD-10-CM

## 2018-05-14 DIAGNOSIS — J30.2 SEASONAL ALLERGIC RHINITIS, UNSPECIFIED CHRONICITY, UNSPECIFIED TRIGGER: ICD-10-CM

## 2018-05-14 PROCEDURE — G8427 DOCREV CUR MEDS BY ELIG CLIN: HCPCS | Performed by: NURSE PRACTITIONER

## 2018-05-14 PROCEDURE — 99214 OFFICE O/P EST MOD 30 MIN: CPT | Performed by: NURSE PRACTITIONER

## 2018-05-14 PROCEDURE — 4040F PNEUMOC VAC/ADMIN/RCVD: CPT | Performed by: NURSE PRACTITIONER

## 2018-05-14 PROCEDURE — 93000 ELECTROCARDIOGRAM COMPLETE: CPT | Performed by: NURSE PRACTITIONER

## 2018-05-14 PROCEDURE — G8598 ASA/ANTIPLAT THER USED: HCPCS | Performed by: NURSE PRACTITIONER

## 2018-05-14 PROCEDURE — 1123F ACP DISCUSS/DSCN MKR DOCD: CPT | Performed by: NURSE PRACTITIONER

## 2018-05-14 PROCEDURE — 1036F TOBACCO NON-USER: CPT | Performed by: NURSE PRACTITIONER

## 2018-05-14 PROCEDURE — G8420 CALC BMI NORM PARAMETERS: HCPCS | Performed by: NURSE PRACTITIONER

## 2018-05-14 PROCEDURE — 99213 OFFICE O/P EST LOW 20 MIN: CPT | Performed by: NURSE PRACTITIONER

## 2018-05-14 RX ORDER — LEVOCETIRIZINE DIHYDROCHLORIDE 5 MG/1
5 TABLET, FILM COATED ORAL NIGHTLY
Qty: 30 TABLET | Refills: 5 | Status: SHIPPED | OUTPATIENT
Start: 2018-05-14 | End: 2018-11-29 | Stop reason: SDUPTHER

## 2018-05-14 ASSESSMENT — PATIENT HEALTH QUESTIONNAIRE - PHQ9
SUM OF ALL RESPONSES TO PHQ QUESTIONS 1-9: 0
1. LITTLE INTEREST OR PLEASURE IN DOING THINGS: 0
2. FEELING DOWN, DEPRESSED OR HOPELESS: 0
SUM OF ALL RESPONSES TO PHQ9 QUESTIONS 1 & 2: 0

## 2018-05-14 ASSESSMENT — ENCOUNTER SYMPTOMS
SORE THROAT: 0
SHORTNESS OF BREATH: 0
ABDOMINAL PAIN: 0
RHINORRHEA: 0
CONSTIPATION: 0
COUGH: 0
TROUBLE SWALLOWING: 0
VOMITING: 0
DIARRHEA: 0
NAUSEA: 0

## 2018-06-15 ENCOUNTER — TELEPHONE (OUTPATIENT)
Dept: PRIMARY CARE CLINIC | Age: 83
End: 2018-06-15

## 2018-06-15 DIAGNOSIS — R79.89 ELEVATED SERUM CREATININE: Primary | ICD-10-CM

## 2018-06-15 LAB
ALBUMIN SERPL-MCNC: 4.1 G/DL
ALP BLD-CCNC: 79 U/L
ALT SERPL-CCNC: 24 U/L
ANION GAP SERPL CALCULATED.3IONS-SCNC: 9 MMOL/L
AST SERPL-CCNC: 20 U/L
AVERAGE GLUCOSE: ABNORMAL
BILIRUB SERPL-MCNC: 0.6 MG/DL (ref 0.1–1.4)
BUN BLDV-MCNC: 20 MG/DL
CALCIUM SERPL-MCNC: 8.6 MG/DL
CHLORIDE BLD-SCNC: 103 MMOL/L
CHOLESTEROL, TOTAL: 148 MG/DL
CHOLESTEROL/HDL RATIO: 0.93
CO2: 27.5 MMOL/L
CREAT SERPL-MCNC: 1.5 MG/DL
GFR CALCULATED: 45
GLUCOSE BLD-MCNC: 102 MG/DL
HBA1C MFR BLD: 6.1 %
HDLC SERPL-MCNC: 47 MG/DL (ref 35–70)
LDL CHOLESTEROL CALCULATED: 74 MG/DL (ref 0–160)
POTASSIUM SERPL-SCNC: 4.1 MMOL/L
SODIUM BLD-SCNC: 139 MMOL/L
TOTAL PROTEIN: 7.6
TRIGL SERPL-MCNC: 134 MG/DL
VLDLC SERPL CALC-MCNC: 27 MG/DL

## 2018-07-17 DIAGNOSIS — F03.90 DEMENTIA WITHOUT BEHAVIORAL DISTURBANCE, UNSPECIFIED DEMENTIA TYPE: ICD-10-CM

## 2018-07-17 RX ORDER — MEMANTINE HYDROCHLORIDE 5 MG/1
5 TABLET ORAL 2 TIMES DAILY
Qty: 180 TABLET | Refills: 3 | Status: SHIPPED | OUTPATIENT
Start: 2018-07-17 | End: 2018-11-21 | Stop reason: SDUPTHER

## 2018-08-23 DIAGNOSIS — E11.9 TYPE 2 DIABETES MELLITUS WITHOUT COMPLICATION (HCC): ICD-10-CM

## 2018-08-23 NOTE — TELEPHONE ENCOUNTER
Received fax from pharmacy requesting refill on pts medication(s). Pt was last seen in office on 5/14/2018  and has a follow up scheduled for 11/14/2018. Will send request to  Breonna Evans  for authorization.      Requested Prescriptions     Pending Prescriptions Disp Refills    TRUE METRIX BLOOD GLUCOSE TEST strip [Pharmacy Med Name: TRUE METRIX GLUCOSE TEST STRIP]  2     Sig: Check blood sugar daily as needed **Please call 30 days in advance for refill**

## 2018-09-24 ENCOUNTER — PROCEDURE VISIT (OUTPATIENT)
Dept: PRIMARY CARE CLINIC | Age: 83
End: 2018-09-24
Payer: COMMERCIAL

## 2018-09-24 DIAGNOSIS — Z23 NEED FOR INFLUENZA VACCINATION: Primary | ICD-10-CM

## 2018-09-24 PROCEDURE — 90471 IMMUNIZATION ADMIN: CPT | Performed by: NURSE PRACTITIONER

## 2018-09-24 PROCEDURE — 90662 IIV NO PRSV INCREASED AG IM: CPT | Performed by: NURSE PRACTITIONER

## 2018-09-24 NOTE — PROGRESS NOTES
After obtaining consent from J [address] [facility] [address], gave patient fluzone high dose injection in Left deltoid, patient tolerated well. Medication was not supplied by the patient.

## 2018-11-21 ENCOUNTER — OFFICE VISIT (OUTPATIENT)
Dept: PRIMARY CARE CLINIC | Age: 83
End: 2018-11-21
Payer: COMMERCIAL

## 2018-11-21 VITALS
SYSTOLIC BLOOD PRESSURE: 144 MMHG | DIASTOLIC BLOOD PRESSURE: 87 MMHG | OXYGEN SATURATION: 94 % | TEMPERATURE: 97.6 F | HEART RATE: 66 BPM | BODY MASS INDEX: 21.56 KG/M2 | WEIGHT: 154 LBS | HEIGHT: 71 IN

## 2018-11-21 DIAGNOSIS — E78.00 PURE HYPERCHOLESTEROLEMIA: ICD-10-CM

## 2018-11-21 DIAGNOSIS — I10 ESSENTIAL HYPERTENSION: ICD-10-CM

## 2018-11-21 DIAGNOSIS — F03.90 DEMENTIA WITHOUT BEHAVIORAL DISTURBANCE, UNSPECIFIED DEMENTIA TYPE: ICD-10-CM

## 2018-11-21 DIAGNOSIS — Z00.00 ROUTINE GENERAL MEDICAL EXAMINATION AT A HEALTH CARE FACILITY: ICD-10-CM

## 2018-11-21 DIAGNOSIS — E11.9 TYPE 2 DIABETES MELLITUS WITHOUT COMPLICATION, WITHOUT LONG-TERM CURRENT USE OF INSULIN (HCC): Primary | ICD-10-CM

## 2018-11-21 DIAGNOSIS — I25.10 CORONARY ARTERY DISEASE INVOLVING NATIVE CORONARY ARTERY OF NATIVE HEART WITHOUT ANGINA PECTORIS: ICD-10-CM

## 2018-11-21 PROCEDURE — G8420 CALC BMI NORM PARAMETERS: HCPCS | Performed by: NURSE PRACTITIONER

## 2018-11-21 PROCEDURE — 99214 OFFICE O/P EST MOD 30 MIN: CPT | Performed by: NURSE PRACTITIONER

## 2018-11-21 PROCEDURE — G8598 ASA/ANTIPLAT THER USED: HCPCS | Performed by: NURSE PRACTITIONER

## 2018-11-21 PROCEDURE — 4040F PNEUMOC VAC/ADMIN/RCVD: CPT | Performed by: NURSE PRACTITIONER

## 2018-11-21 PROCEDURE — G0439 PPPS, SUBSEQ VISIT: HCPCS | Performed by: NURSE PRACTITIONER

## 2018-11-21 PROCEDURE — 1123F ACP DISCUSS/DSCN MKR DOCD: CPT | Performed by: NURSE PRACTITIONER

## 2018-11-21 PROCEDURE — G0444 DEPRESSION SCREEN ANNUAL: HCPCS | Performed by: NURSE PRACTITIONER

## 2018-11-21 PROCEDURE — 1036F TOBACCO NON-USER: CPT | Performed by: NURSE PRACTITIONER

## 2018-11-21 PROCEDURE — G8427 DOCREV CUR MEDS BY ELIG CLIN: HCPCS | Performed by: NURSE PRACTITIONER

## 2018-11-21 PROCEDURE — 1101F PT FALLS ASSESS-DOCD LE1/YR: CPT | Performed by: NURSE PRACTITIONER

## 2018-11-21 PROCEDURE — G8482 FLU IMMUNIZE ORDER/ADMIN: HCPCS | Performed by: NURSE PRACTITIONER

## 2018-11-21 RX ORDER — MEMANTINE HYDROCHLORIDE 10 MG/1
10 TABLET ORAL 2 TIMES DAILY
Qty: 180 TABLET | Refills: 3 | Status: SHIPPED | OUTPATIENT
Start: 2018-11-21 | End: 2020-01-01

## 2018-11-21 ASSESSMENT — ANXIETY QUESTIONNAIRES: GAD7 TOTAL SCORE: 2

## 2018-11-21 ASSESSMENT — ENCOUNTER SYMPTOMS
SORE THROAT: 0
RHINORRHEA: 0
SHORTNESS OF BREATH: 0
DIARRHEA: 0
TROUBLE SWALLOWING: 0
VOMITING: 0
ABDOMINAL PAIN: 0
NAUSEA: 0
COUGH: 0
CONSTIPATION: 0

## 2018-11-21 ASSESSMENT — PATIENT HEALTH QUESTIONNAIRE - PHQ9: SUM OF ALL RESPONSES TO PHQ QUESTIONS 1-9: 11

## 2018-11-21 ASSESSMENT — LIFESTYLE VARIABLES: HOW OFTEN DO YOU HAVE A DRINK CONTAINING ALCOHOL: 0

## 2018-11-21 NOTE — PROGRESS NOTES
History   Problem Relation Age of Onset    Diabetes Father     Heart Disease Father     Cancer Brother        Social History   Substance Use Topics    Smoking status: Former Smoker    Smokeless tobacco: Never Used    Alcohol use No      Current Outpatient Prescriptions   Medication Sig Dispense Refill    memantine (NAMENDA) 10 MG tablet Take 1 tablet by mouth 2 times daily 180 tablet 3    blood glucose test strips (TRUE METRIX BLOOD GLUCOSE TEST) strip 1 each by Does not apply route daily 100 strip 3    levocetirizine (XYZAL ALLERGY 24HR) 5 MG tablet Take 1 tablet by mouth nightly 30 tablet 5    aspirin EC 81 MG EC tablet Take 1 tablet by mouth daily 30 tablet 3    vitamin B-12 (CYANOCOBALAMIN) 500 MCG tablet Take 500 mcg by mouth daily      sitaGLIPtan-metformin (JANUMET)  MG per tablet Take 1 tablet by mouth 2 times daily (with meals) 180 tablet 3    metoprolol succinate (TOPROL XL) 100 MG extended release tablet Take 1 tablet by mouth daily 90 tablet 3    irbesartan (AVAPRO) 300 MG tablet Take 1 tablet by mouth daily 90 tablet 3    clopidogrel (PLAVIX) 75 MG tablet Take one tablet by mouth every day **Please call 30 days in advance for refill** 90 tablet 3    atorvastatin (LIPITOR) 40 MG tablet Take 1 tablet by mouth daily 90 tablet 3    Glucosamine-Chondroitin (GLUCOSAMINE CHONDR COMPLEX PO) Take 1 tablet by mouth daily      Multiple Vitamins-Minerals (THERAPEUTIC MULTIVITAMIN-MINERALS) tablet Take 1 tablet by mouth daily. No current facility-administered medications for this visit.       No Known Allergies    Health Maintenance   Topic Date Due    DTaP/Tdap/Td vaccine (1 - Tdap) 05/23/1954    Shingles Vaccine (1 of 2 - 2 Dose Series) 05/23/1987    Potassium monitoring  06/09/2019    Creatinine monitoring  06/09/2019    Flu vaccine  Completed    Pneumococcal low/med risk  Completed        Subjective:     Review of Systems   Constitutional: Negative for activity change, appetite out of the flow of traffic and maintained in good condition? Have frayed cords been replaced? Are all small rugs and runners slip resistant? If not, you can secure them to the floor with a special double-sided carpet tape. Are smoke detectors properly locatedone on every floor of your home and one outside of every sleeping area? Are they in good working order? Are batteries replaced at least once a year? Do you have a well-maintained carbon monoxide detector outside every sleeping are in your home? Does your furniture layout leave plenty of space to maneuver between and around chairs, tables, beds, and sofas? Are hallways, stairs and passages between rooms well lit? Can you reach a lamp without getting out of bed? Are floor surfaces well maintained? Shag rugs, high-pile carpeting, tile floors, and polished wood floors can be particularly slippery. Stairs should always have handrails and be carpeted or fitted with a non-skid tread. Is your telephone easily reachable. Is the cord safely tucked away? Room by Room   According to the Association of Aging, bathrooms and lindsay are the two most potentially hazardous rooms in your home. In the Kitchen    Be sure your stove is in proper working order and always make sure burners and the oven are off before you go out or go to sleep. Keep pots on the back burners, turn handles away from the front of the stove, and keep stove clean and free of grease build-up. Kitchen ventilation systems and range exhausts should be working properly. Keep flammable objects such as towels and pot holders away from the cooking area except when in use. Make sure kitchen curtains are tied back. Move cords and appliances away from the sink and hot surfaces. If extension cords are needed, install wiring guides so they do not hang over the sink, range, or working areas. Look for coffee pots, kettles and toaster ovens with automatic shut-offs.     Keep a mop handy in the kitchen so you can wipe up spills instantly. You should also have a small fire extinguisher. Arrange your kitchen with frequently used items on lower shelves to avoid the need to stand on a stepstool to reach them. Make sure countertops are well-lit to avoid injuries while cutting and preparing food. In the Bathroom    Use a non-slip mat or decals in the tub and shower, since wet, soapy tile or porcelain surfaces are extremely slippery. Make sure bathroom rugs are non-skid or tape them firmly to the floor. Bathtubs should have at least one, preferably two, grab bars, firmly attached to structural supports in the wall. (Do not use built-in soap holders or glass shower doors as grab bars.)    Tub seats fitted with non-slip material on the legs allow you to wash sitting down. For people with limited mobility, bathtub transfer benches allow you to slide safely into the tub. Raised toilet seats and toilet safety rails are helpful for those with knee or hip problems. In the [facility]    Make sure you use a nightlight and that the area around your bed is clear of potential obstacles. Be careful with electric blankets and never go to sleep with a heating pad, which can cause serious burns even if on a low setting. Use fire-resistant mattress covers and pillows, and NEVER smoke in bed. Keep a phone next to the bed that is programmed to dial 911 at the push of a button. If you have a chronic condition, you may want to sign on with an automatic call-in service. Typically the system includes a small pendant that connects directly to an emergency medical voice-response system. You should also make arrangements to stay in contact with someonefriend, neighbor, family memberon a regular schedule.    Fire Prevention   According to the [employer] (Smoke Alarms for Every) [address] [place] [address], senior citizens are one of the two highest risk groups for death and serious injuries due to residential professional. [address] any warranty or liability for your use of this information. ·        Learning About Living [place]  What is a living will? A living will is a legal form you use to write down the kind of care you want at the end of your life. It is used by the health professionals who will treat you if you aren't able to decide for yourself. If you put your wishes in writing, your loved ones and others will know what kind of care you want. They won't need to guess. This can ease your mind and be helpful to others. A living will is not the same as an estate or property will. An estate will explains what you want to happen with your money and property after you die. Is a living will a legal document? A living will is a legal document. Each state has its own laws about living moralez. If you move to another state, make sure that your living will is legal in the state where you now live. Or you might use a universal form that has been approved by many states. This kind of form can sometimes be completed and stored online. Your electronic copy will then be available wherever you have a connection to the Internet. In most cases, doctors will respect your wishes even if you have a form from a different state. You don't need an  to complete a living will. But legal advice can be helpful if your state's laws are unclear, your health history is complicated, or your family can't agree on what should be in your living will. You can change your living will at any time. Some people find that their wishes about end-of-life care change as their health changes. In addition to making a living will, think about completing a medical power of  form. This form lets you name the person you want to make end-of-life treatment decisions for you (your \"health care agent\") if you're not able to.  Many hospitals and nursing homes will give you the forms you need to complete a living will [email or web address] and sign in to your [employer] account. Enter Y306 in the [facility] box to learn more about \"Learning About Living [place]. \"     If you do not have an account, please click on the \"Sign Up Now\" link. Current as of: April 19, 2018  Content Version: 11.8  © 7882-6632 [employer]. Care instructions adapted under license by [facility] ([facility]). If you have questions about a medical condition or this instruction, always ask your healthcare professional. [address] any warranty or liability for your use of this information. ·        Learning About Durable Power of  for Health Care  What is a durable power of  for health care? A durable power of  for health care is one type of the legal forms called advance directives. It lets you decide who you want to make treatment decisions for you if you cannot speak or decide for yourself. The person you choose is called your health care agent. Another type of advance directive is a living will. It lets you write down what kinds of treatment or life support you want or do not want. What should you think about when choosing a health care agent? Choose your health care agent carefully. This person may or may not be a family member. Talk to the person before you make your final decision. Make sure he or she is comfortable with this responsibility. It's a good idea to choose someone who:  Is at least 25years old. Knows you well and understands what makes life meaningful for you. Understands your Sikh and moral values. Will do what you want, not what he or she wants. Will be able to make difficult choices at a stressful time. Will be able to refuse or stop treatment, if that is what you would want, even if you could die. Will be firm and confident with health professionals if needed. Will ask questions to get necessary information.   Lives near you or agrees to 2018  Content Version: 11.8  © 5460-8955 [employer]. Care instructions adapted under license by [facility] ([facility]). If you have questions about a medical condition or this instruction, always ask your healthcare professional. [address] any warranty or liability for your use of this information.     ·         Electronically signed by J LUIS Neely on11/21/2018 at 3:04 PM

## 2018-11-21 NOTE — PATIENT INSTRUCTIONS
nutritional needs are being met? Can herbs and supplements still offer a benefit? Researchers have investigated a range of natural remedies, such as ginkgo , ginseng , and the supplement phosphatidylserine (PS). So far, though, the evidence is inconsistent as to whether these products can improve memory or thinking. If you are interested in taking herbs and supplements, talk to your doctor first because they may interact with other medicines that you are taking. Exercise Regularly    Among the many benefits of regular exercise are increased blood flow to the brain and decreased risk of certain diseases that can interfere with memory function. One study found that even moderate exercise has a beneficial effect. Examples of \"moderate\" exercise include:   Playing 18 holes of golf once a week, without a cart   Playing tennis twice a week   Walking one mile per day   Manage Stress    It can be tough to remember what is important when your mind is cluttered. Make time for relaxation. Choose activities that calm you down, and make it routine. Manage Chronic Conditions    Side effects of high blood pressure , diabetes, and heart disease can interfere with mental function. Many of the lifestyle steps discussed here can help manage these conditions. Strive to eat a healthy diet, exercise regularly, get stress under control, and follow your doctor's advice for your condition. Minimize Medications    Talk to your doctor about the medicines that you take. Some may be unnecessary. Also, healthy lifestyle habits may lower the need for certain drugs. Last Reviewed: April 2010 Iain Petit MD   Updated: 4/13/2010   ·     [address]       As we get older, changes in balance, gait, strength, vision, hearing, and cognition make even the most youthful senior more prone to accidents. Falls are one of the leading health risks for older people.  This increased risk of falling is related to:   Aging process (eg, cannot see clearly, you are more likely to fall. Important questions to ask yourself include:   Are lamp, electric, extension, and telephone cords placed out of the flow of traffic and maintained in good condition? Have frayed cords been replaced? Are all small rugs and runners slip resistant? If not, you can secure them to the floor with a special double-sided carpet tape. Are smoke detectors properly locatedone on every floor of your home and one outside of every sleeping area? Are they in good working order? Are batteries replaced at least once a year? Do you have a well-maintained carbon monoxide detector outside every sleeping are in your home? Does your furniture layout leave plenty of space to maneuver between and around chairs, tables, beds, and sofas? Are hallways, stairs and passages between rooms well lit? Can you reach a lamp without getting out of bed? Are floor surfaces well maintained? Shag rugs, high-pile carpeting, tile floors, and polished wood floors can be particularly slippery. Stairs should always have handrails and be carpeted or fitted with a non-skid tread. Is your telephone easily reachable. Is the cord safely tucked away? Room by Room   According to the Association of Aging, bathrooms and lindsay are the two most potentially hazardous rooms in your home. In the Kitchen    Be sure your stove is in proper working order and always make sure burners and the oven are off before you go out or go to sleep. Keep pots on the back burners, turn handles away from the front of the stove, and keep stove clean and free of grease build-up. Kitchen ventilation systems and range exhausts should be working properly. Keep flammable objects such as towels and pot holders away from the cooking area except when in use. Make sure kitchen curtains are tied back. Move cords and appliances away from the sink and hot surfaces.  If extension cords are needed, install wiring guides so Clearly stating your wishes can make it easier for your loved ones. Making plans while you are still able may also ease your mind and make your final days less stressful and more meaningful. Follow-up care is a key part of your treatment and safety. Be sure to make and go to all appointments, and call your doctor if you are having problems. It's also a good idea to know your test results and keep a list of the medicines you take. What can you do to plan for the end of life? You can bring these issues up with your doctor. You do not need to wait until your doctor starts the conversation. You might start with \"I would not be willing to live with . Junius Kumar Junius Kumar Junius Kumar \" When you complete this sentence it helps your doctor understand your wishes. Talk openly and honestly with your doctor. This is the best way to understand the decisions you will need to make as your health changes. Know that you can always change your mind. Ask your doctor about commonly used life-support measures. These include tube feedings, breathing machines, and fluids given through a vein (IV). Understanding these treatments will help you decide whether you want them. You may choose to have these life-supporting treatments for a limited time. This allows a trial period to see whether they will help you. You may also decide that you want your doctor to take only certain measures to keep you alive. It is important to spell out these conditions so that your doctor and family understand them. Talk to your doctor about how long you are likely to live. He or she may be able to give you an idea of what usually happens with your specific illness. Think about preparing papers that state your wishes. This way there will not be any confusion about what you want. You can change your instructions at any time. Which papers should you prepare? Advance directives are legal papers that tell doctors how you want to be cared for at the end of your life.  You do not need a Incorporated. Care instructions adapted under license by [facility] ([facility]). If you have questions about a medical condition or this instruction, always ask your healthcare professional. [address] any warranty or liability for your use of this information. ·        Learning About Living Jayden Mcgowan  What is a living will? A living will is a legal form you use to write down the kind of care you want at the end of your life. It is used by the health professionals who will treat you if you aren't able to decide for yourself. If you put your wishes in writing, your loved ones and others will know what kind of care you want. They won't need to guess. This can ease your mind and be helpful to others. A living will is not the same as an estate or property will. An estate will explains what you want to happen with your money and property after you die. Is a living will a legal document? A living will is a legal document. Each state has its own laws about living moralez. If you move to another state, make sure that your living will is legal in the state where you now live. Or you might use a universal form that has been approved by many states. This kind of form can sometimes be completed and stored online. Your electronic copy will then be available wherever you have a connection to the Internet. In most cases, doctors will respect your wishes even if you have a form from a different state. You don't need an  to complete a living will. But legal advice can be helpful if your state's laws are unclear, your health history is complicated, or your family can't agree on what should be in your living will. You can change your living will at any time. Some people find that their wishes about end-of-life care change as their health changes. In addition to making a living will, think about completing a medical power of  form.  This form lets you name the person you want to make end-of-life treatment decisions for you (your \"health care agent\") if you're not able to. Many hospitals and nursing homes will give you the forms you need to complete a living will and a medical power of . Your living will is used only if you can't make or communicate decisions for yourself anymore. If you become able to make decisions again, you can accept or refuse any treatment, no matter what you wrote in your living will. Your state may offer an online registry. This is a place where you can store your living will online so the doctors and nurses who need to treat you can find it right away. What should you think about when creating a living will? Talk about your end-of-life wishes with your family members and your doctor. Let them know what you want. That way the people making decisions for you won't be surprised by your choices. Think about these questions as you make your living will:  Do you know enough about life support methods that might be used? If not, talk to your doctor so you know what might be done if you can't breathe on your own, your heart stops, or you're unable to swallow. What things would you still want to be able to do after you receive life-support methods? Would you want to be able to walk? To speak? To eat on your own? To live without the help of machines? If you have a choice, where do you want to be cared for? In your home? At a hospital or nursing home? Do you want certain Confucianism practices performed if you become very ill? If you have a choice at the end of your life, where would you prefer to die? At home? In a hospital or nursing home? Somewhere else? Would you prefer to be buried or cremated? Do you want your organs to be donated after you die? What should you do with your living will? Make sure that your family members and your health care agent have copies of your living will. Give your doctor a copy of your living will to keep in your medical record.  If you have

## 2018-11-26 ENCOUNTER — TELEPHONE (OUTPATIENT)
Dept: PRIMARY CARE CLINIC | Age: 83
End: 2018-11-26

## 2018-11-26 DIAGNOSIS — Z01.10 ENCOUNTER FOR HEARING EVALUATION: Primary | ICD-10-CM

## 2018-11-29 DIAGNOSIS — E78.00 PURE HYPERCHOLESTEROLEMIA: ICD-10-CM

## 2018-11-29 DIAGNOSIS — J30.2 SEASONAL ALLERGIC RHINITIS: ICD-10-CM

## 2018-11-29 RX ORDER — ATORVASTATIN CALCIUM 40 MG/1
40 TABLET, FILM COATED ORAL NIGHTLY
Qty: 90 TABLET | Refills: 1 | Status: SHIPPED | OUTPATIENT
Start: 2018-11-29 | End: 2018-12-06 | Stop reason: SDUPTHER

## 2018-11-29 RX ORDER — LEVOCETIRIZINE DIHYDROCHLORIDE 5 MG/1
5 TABLET, FILM COATED ORAL NIGHTLY
Qty: 90 TABLET | Refills: 3 | Status: SHIPPED | OUTPATIENT
Start: 2018-11-29 | End: 2018-12-06 | Stop reason: SDUPTHER

## 2018-12-04 DIAGNOSIS — Z01.10 ENCOUNTER FOR HEARING EVALUATION: Primary | ICD-10-CM

## 2018-12-05 ENCOUNTER — OFFICE VISIT (OUTPATIENT)
Dept: OTOLARYNGOLOGY | Age: 83
End: 2018-12-05
Payer: COMMERCIAL

## 2018-12-05 ENCOUNTER — TELEPHONE (OUTPATIENT)
Dept: OTOLARYNGOLOGY | Age: 83
End: 2018-12-05

## 2018-12-05 VITALS — WEIGHT: 154 LBS | BODY MASS INDEX: 21.56 KG/M2 | HEIGHT: 71 IN

## 2018-12-05 DIAGNOSIS — H90.8 MIXED HEARING LOSS, UNILATERAL: ICD-10-CM

## 2018-12-05 DIAGNOSIS — H65.93 OME (OTITIS MEDIA WITH EFFUSION), BILATERAL: Primary | ICD-10-CM

## 2018-12-05 DIAGNOSIS — H66.93 RECURRENT OTITIS MEDIA, BILATERAL: Primary | ICD-10-CM

## 2018-12-05 PROCEDURE — G8420 CALC BMI NORM PARAMETERS: HCPCS | Performed by: OTOLARYNGOLOGY

## 2018-12-05 PROCEDURE — G8482 FLU IMMUNIZE ORDER/ADMIN: HCPCS | Performed by: OTOLARYNGOLOGY

## 2018-12-05 PROCEDURE — 92557 COMPREHENSIVE HEARING TEST: CPT | Performed by: AUDIOLOGIST

## 2018-12-05 PROCEDURE — 99203 OFFICE O/P NEW LOW 30 MIN: CPT | Performed by: OTOLARYNGOLOGY

## 2018-12-05 PROCEDURE — 4040F PNEUMOC VAC/ADMIN/RCVD: CPT | Performed by: OTOLARYNGOLOGY

## 2018-12-05 PROCEDURE — 92567 TYMPANOMETRY: CPT | Performed by: AUDIOLOGIST

## 2018-12-05 PROCEDURE — 1101F PT FALLS ASSESS-DOCD LE1/YR: CPT | Performed by: OTOLARYNGOLOGY

## 2018-12-05 PROCEDURE — 1123F ACP DISCUSS/DSCN MKR DOCD: CPT | Performed by: OTOLARYNGOLOGY

## 2018-12-05 PROCEDURE — G8427 DOCREV CUR MEDS BY ELIG CLIN: HCPCS | Performed by: OTOLARYNGOLOGY

## 2018-12-05 PROCEDURE — 1036F TOBACCO NON-USER: CPT | Performed by: OTOLARYNGOLOGY

## 2018-12-05 PROCEDURE — G8598 ASA/ANTIPLAT THER USED: HCPCS | Performed by: OTOLARYNGOLOGY

## 2018-12-06 DIAGNOSIS — J30.2 SEASONAL ALLERGIC RHINITIS, UNSPECIFIED TRIGGER: ICD-10-CM

## 2018-12-06 DIAGNOSIS — E78.00 PURE HYPERCHOLESTEROLEMIA: ICD-10-CM

## 2018-12-06 DIAGNOSIS — F03.90 DEMENTIA WITHOUT BEHAVIORAL DISTURBANCE, UNSPECIFIED DEMENTIA TYPE: ICD-10-CM

## 2018-12-06 RX ORDER — LEVOCETIRIZINE DIHYDROCHLORIDE 5 MG/1
5 TABLET, FILM COATED ORAL NIGHTLY
Qty: 90 TABLET | Refills: 3 | Status: SHIPPED | OUTPATIENT
Start: 2018-12-06 | End: 2019-01-01

## 2018-12-06 RX ORDER — ATORVASTATIN CALCIUM 40 MG/1
40 TABLET, FILM COATED ORAL NIGHTLY
Qty: 90 TABLET | Refills: 1 | Status: SHIPPED | OUTPATIENT
Start: 2018-12-06 | End: 2019-01-01 | Stop reason: SDUPTHER

## 2018-12-07 ENCOUNTER — TELEPHONE (OUTPATIENT)
Dept: OTOLARYNGOLOGY | Age: 83
End: 2018-12-07

## 2018-12-12 ENCOUNTER — PROCEDURE VISIT (OUTPATIENT)
Dept: OTOLARYNGOLOGY | Age: 83
End: 2018-12-12
Payer: COMMERCIAL

## 2018-12-12 VITALS
TEMPERATURE: 98 F | HEART RATE: 76 BPM | BODY MASS INDEX: 21.56 KG/M2 | RESPIRATION RATE: 16 BRPM | DIASTOLIC BLOOD PRESSURE: 72 MMHG | HEIGHT: 71 IN | SYSTOLIC BLOOD PRESSURE: 126 MMHG | OXYGEN SATURATION: 96 % | WEIGHT: 154 LBS

## 2018-12-12 DIAGNOSIS — H65.92 OME (OTITIS MEDIA WITH EFFUSION), LEFT: Primary | ICD-10-CM

## 2018-12-12 PROCEDURE — G8482 FLU IMMUNIZE ORDER/ADMIN: HCPCS | Performed by: OTOLARYNGOLOGY

## 2018-12-12 PROCEDURE — G8427 DOCREV CUR MEDS BY ELIG CLIN: HCPCS | Performed by: OTOLARYNGOLOGY

## 2018-12-12 PROCEDURE — 1101F PT FALLS ASSESS-DOCD LE1/YR: CPT | Performed by: OTOLARYNGOLOGY

## 2018-12-12 PROCEDURE — 4040F PNEUMOC VAC/ADMIN/RCVD: CPT | Performed by: OTOLARYNGOLOGY

## 2018-12-12 PROCEDURE — G8598 ASA/ANTIPLAT THER USED: HCPCS | Performed by: OTOLARYNGOLOGY

## 2018-12-12 PROCEDURE — 1123F ACP DISCUSS/DSCN MKR DOCD: CPT | Performed by: OTOLARYNGOLOGY

## 2018-12-12 PROCEDURE — 69433 CREATE EARDRUM OPENING: CPT | Performed by: OTOLARYNGOLOGY

## 2018-12-12 PROCEDURE — G8420 CALC BMI NORM PARAMETERS: HCPCS | Performed by: OTOLARYNGOLOGY

## 2018-12-12 PROCEDURE — 1036F TOBACCO NON-USER: CPT | Performed by: OTOLARYNGOLOGY

## 2018-12-13 DIAGNOSIS — I25.10 CORONARY ARTERY DISEASE INVOLVING NATIVE HEART WITHOUT ANGINA PECTORIS, UNSPECIFIED VESSEL OR LESION TYPE: ICD-10-CM

## 2018-12-13 DIAGNOSIS — I10 ESSENTIAL HYPERTENSION: ICD-10-CM

## 2018-12-13 RX ORDER — METOPROLOL SUCCINATE 100 MG/1
100 TABLET, EXTENDED RELEASE ORAL DAILY
Qty: 90 TABLET | Refills: 3 | Status: SHIPPED | OUTPATIENT
Start: 2018-12-13 | End: 2019-01-01

## 2018-12-13 RX ORDER — CLOPIDOGREL BISULFATE 75 MG/1
TABLET ORAL
Qty: 90 TABLET | Refills: 3 | Status: SHIPPED | OUTPATIENT
Start: 2018-12-13 | End: 2020-01-01

## 2018-12-21 ENCOUNTER — OFFICE VISIT (OUTPATIENT)
Dept: CARDIOLOGY | Age: 83
End: 2018-12-21
Payer: COMMERCIAL

## 2018-12-21 VITALS
DIASTOLIC BLOOD PRESSURE: 74 MMHG | WEIGHT: 155 LBS | HEART RATE: 78 BPM | SYSTOLIC BLOOD PRESSURE: 122 MMHG | HEIGHT: 71 IN | BODY MASS INDEX: 21.7 KG/M2

## 2018-12-21 DIAGNOSIS — I10 ESSENTIAL HYPERTENSION: ICD-10-CM

## 2018-12-21 DIAGNOSIS — E78.00 PURE HYPERCHOLESTEROLEMIA: ICD-10-CM

## 2018-12-21 DIAGNOSIS — I51.89 GRADE I DIASTOLIC DYSFUNCTION: ICD-10-CM

## 2018-12-21 DIAGNOSIS — I35.1 NONRHEUMATIC AORTIC VALVE INSUFFICIENCY: ICD-10-CM

## 2018-12-21 DIAGNOSIS — I25.10 CORONARY ARTERY DISEASE INVOLVING NATIVE CORONARY ARTERY OF NATIVE HEART WITHOUT ANGINA PECTORIS: Primary | ICD-10-CM

## 2018-12-21 PROCEDURE — G8482 FLU IMMUNIZE ORDER/ADMIN: HCPCS | Performed by: NURSE PRACTITIONER

## 2018-12-21 PROCEDURE — 93000 ELECTROCARDIOGRAM COMPLETE: CPT | Performed by: NURSE PRACTITIONER

## 2018-12-21 PROCEDURE — 1123F ACP DISCUSS/DSCN MKR DOCD: CPT | Performed by: NURSE PRACTITIONER

## 2018-12-21 PROCEDURE — 4040F PNEUMOC VAC/ADMIN/RCVD: CPT | Performed by: NURSE PRACTITIONER

## 2018-12-21 PROCEDURE — G8420 CALC BMI NORM PARAMETERS: HCPCS | Performed by: NURSE PRACTITIONER

## 2018-12-21 PROCEDURE — 1036F TOBACCO NON-USER: CPT | Performed by: NURSE PRACTITIONER

## 2018-12-21 PROCEDURE — 99213 OFFICE O/P EST LOW 20 MIN: CPT | Performed by: NURSE PRACTITIONER

## 2018-12-21 PROCEDURE — 1101F PT FALLS ASSESS-DOCD LE1/YR: CPT | Performed by: NURSE PRACTITIONER

## 2018-12-21 PROCEDURE — G8598 ASA/ANTIPLAT THER USED: HCPCS | Performed by: NURSE PRACTITIONER

## 2018-12-21 PROCEDURE — G8427 DOCREV CUR MEDS BY ELIG CLIN: HCPCS | Performed by: NURSE PRACTITIONER

## 2018-12-21 RX ORDER — IRBESARTAN 300 MG/1
300 TABLET ORAL DAILY
Qty: 90 TABLET | Refills: 3 | Status: SHIPPED | OUTPATIENT
Start: 2018-12-21 | End: 2020-01-01 | Stop reason: SDUPTHER

## 2019-01-01 ENCOUNTER — HOSPITAL ENCOUNTER (OUTPATIENT)
Dept: NUCLEAR MEDICINE | Age: 84
Discharge: HOME | End: 2019-10-03
Payer: MEDICARE

## 2019-01-01 ENCOUNTER — TELEPHONE (OUTPATIENT)
Dept: PRIMARY CARE CLINIC | Age: 84
End: 2019-01-01

## 2019-01-01 ENCOUNTER — PROCEDURE VISIT (OUTPATIENT)
Dept: PRIMARY CARE CLINIC | Age: 84
End: 2019-01-01
Payer: MEDICARE

## 2019-01-01 ENCOUNTER — OFFICE VISIT (OUTPATIENT)
Dept: CARDIOLOGY | Age: 84
End: 2019-01-01
Payer: COMMERCIAL

## 2019-01-01 ENCOUNTER — OFFICE VISIT (OUTPATIENT)
Dept: PRIMARY CARE CLINIC | Age: 84
End: 2019-01-01
Payer: MEDICARE

## 2019-01-01 ENCOUNTER — HOSPITAL ENCOUNTER (OUTPATIENT)
Dept: NON INVASIVE DIAGNOSTICS | Age: 84
Discharge: HOME | End: 2019-10-01
Payer: MEDICARE

## 2019-01-01 VITALS
HEART RATE: 67 BPM | DIASTOLIC BLOOD PRESSURE: 68 MMHG | TEMPERATURE: 97.5 F | HEIGHT: 71 IN | SYSTOLIC BLOOD PRESSURE: 102 MMHG | OXYGEN SATURATION: 96 % | WEIGHT: 154 LBS | BODY MASS INDEX: 21.56 KG/M2

## 2019-01-01 VITALS
SYSTOLIC BLOOD PRESSURE: 130 MMHG | HEIGHT: 72 IN | HEART RATE: 74 BPM | BODY MASS INDEX: 21.26 KG/M2 | DIASTOLIC BLOOD PRESSURE: 78 MMHG | WEIGHT: 157 LBS

## 2019-01-01 VITALS
HEIGHT: 71 IN | DIASTOLIC BLOOD PRESSURE: 79 MMHG | SYSTOLIC BLOOD PRESSURE: 144 MMHG | HEART RATE: 80 BPM | BODY MASS INDEX: 21.45 KG/M2 | WEIGHT: 153.25 LBS | TEMPERATURE: 97.6 F | OXYGEN SATURATION: 96 %

## 2019-01-01 DIAGNOSIS — I10 ESSENTIAL HYPERTENSION: ICD-10-CM

## 2019-01-01 DIAGNOSIS — I25.10 CORONARY ARTERY DISEASE INVOLVING NATIVE CORONARY ARTERY OF NATIVE HEART WITHOUT ANGINA PECTORIS: ICD-10-CM

## 2019-01-01 DIAGNOSIS — Z95.1 HISTORY OF CORONARY ARTERY BYPASS GRAFT X 3: ICD-10-CM

## 2019-01-01 DIAGNOSIS — E78.5 DYSLIPIDEMIA: ICD-10-CM

## 2019-01-01 DIAGNOSIS — L72.0 INFECTED EPIDERMOID CYST: Primary | ICD-10-CM

## 2019-01-01 DIAGNOSIS — Z23 NEED FOR INFLUENZA VACCINATION: Primary | ICD-10-CM

## 2019-01-01 DIAGNOSIS — E78.00 PURE HYPERCHOLESTEROLEMIA: ICD-10-CM

## 2019-01-01 DIAGNOSIS — I25.10 CORONARY ARTERY DISEASE INVOLVING NATIVE CORONARY ARTERY OF NATIVE HEART WITHOUT ANGINA PECTORIS: Primary | ICD-10-CM

## 2019-01-01 DIAGNOSIS — J30.2 SEASONAL ALLERGIC RHINITIS, UNSPECIFIED TRIGGER: ICD-10-CM

## 2019-01-01 DIAGNOSIS — L08.9 INFECTED EPIDERMOID CYST: Primary | ICD-10-CM

## 2019-01-01 DIAGNOSIS — I45.10 RIGHT BUNDLE BRANCH BLOCK (RBBB): ICD-10-CM

## 2019-01-01 DIAGNOSIS — K30 INDIGESTION: ICD-10-CM

## 2019-01-01 DIAGNOSIS — I45.10 RIGHT BUNDLE BRANCH BLOCK (RBBB): Primary | ICD-10-CM

## 2019-01-01 DIAGNOSIS — E11.9 TYPE 2 DIABETES MELLITUS WITHOUT COMPLICATION (HCC): ICD-10-CM

## 2019-01-01 DIAGNOSIS — E11.9 TYPE 2 DIABETES MELLITUS WITHOUT COMPLICATION, WITHOUT LONG-TERM CURRENT USE OF INSULIN (HCC): ICD-10-CM

## 2019-01-01 LAB
ALBUMIN SERPL-MCNC: 4.3 G/DL (ref 3.5–5.2)
ALP BLD-CCNC: 91 U/L (ref 40–130)
ALT SERPL-CCNC: 15 U/L (ref 5–41)
ANION GAP SERPL CALCULATED.3IONS-SCNC: 17 MMOL/L (ref 7–19)
AST SERPL-CCNC: 16 U/L (ref 5–40)
BASOPHILS ABSOLUTE: 0.1 K/UL (ref 0–0.2)
BASOPHILS RELATIVE PERCENT: 1.2 % (ref 0–1)
BILIRUB SERPL-MCNC: 0.3 MG/DL (ref 0.2–1.2)
BUN BLDV-MCNC: 19 MG/DL (ref 8–23)
CALCIUM SERPL-MCNC: 9.6 MG/DL (ref 8.8–10.2)
CHLORIDE BLD-SCNC: 111 MMOL/L (ref 98–111)
CHOLESTEROL, TOTAL: 132 MG/DL (ref 160–199)
CO2: 18 MMOL/L (ref 22–29)
CREAT SERPL-MCNC: 1.4 MG/DL (ref 0.5–1.2)
EOSINOPHILS ABSOLUTE: 0.3 K/UL (ref 0–0.6)
EOSINOPHILS RELATIVE PERCENT: 4.4 % (ref 0–5)
GFR NON-AFRICAN AMERICAN: 48
GLUCOSE BLD-MCNC: 115 MG/DL (ref 74–109)
HBA1C MFR BLD: 6.1 % (ref 4–6)
HCT VFR BLD CALC: 48.4 % (ref 42–52)
HDLC SERPL-MCNC: 37 MG/DL (ref 55–121)
HEMOGLOBIN: 15.7 G/DL (ref 14–18)
IMMATURE GRANULOCYTES #: 0 K/UL
LDL CHOLESTEROL CALCULATED: 61 MG/DL
LV EF: 57 %
LVEF MODALITY: NORMAL
LYMPHOCYTES ABSOLUTE: 1.8 K/UL (ref 1.1–4.5)
LYMPHOCYTES RELATIVE PERCENT: 23.2 % (ref 20–40)
MCH RBC QN AUTO: 33.1 PG (ref 27–31)
MCHC RBC AUTO-ENTMCNC: 32.4 G/DL (ref 33–37)
MCV RBC AUTO: 102.1 FL (ref 80–94)
MONOCYTES ABSOLUTE: 0.6 K/UL (ref 0–0.9)
MONOCYTES RELATIVE PERCENT: 8.1 % (ref 0–10)
NEUTROPHILS ABSOLUTE: 4.9 K/UL (ref 1.5–7.5)
NEUTROPHILS RELATIVE PERCENT: 62.7 % (ref 50–65)
PDW BLD-RTO: 12.1 % (ref 11.5–14.5)
PLATELET # BLD: 163 K/UL (ref 130–400)
PMV BLD AUTO: 11.1 FL (ref 9.4–12.4)
POTASSIUM SERPL-SCNC: 4.3 MMOL/L (ref 3.5–5)
RBC # BLD: 4.74 M/UL (ref 4.7–6.1)
SODIUM BLD-SCNC: 146 MMOL/L (ref 136–145)
TOTAL PROTEIN: 7.2 G/DL (ref 6.6–8.7)
TRIGL SERPL-MCNC: 169 MG/DL (ref 0–149)
WBC # BLD: 7.8 K/UL (ref 4.8–10.8)

## 2019-01-01 PROCEDURE — G8598 ASA/ANTIPLAT THER USED: HCPCS | Performed by: NURSE PRACTITIONER

## 2019-01-01 PROCEDURE — 10060 I&D ABSCESS SIMPLE/SINGLE: CPT | Performed by: NURSE PRACTITIONER

## 2019-01-01 PROCEDURE — 78452 HT MUSCLE IMAGE SPECT MULT: CPT

## 2019-01-01 PROCEDURE — 90653 IIV ADJUVANT VACCINE IM: CPT | Performed by: NURSE PRACTITIONER

## 2019-01-01 PROCEDURE — 4040F PNEUMOC VAC/ADMIN/RCVD: CPT | Performed by: NURSE PRACTITIONER

## 2019-01-01 PROCEDURE — 1036F TOBACCO NON-USER: CPT | Performed by: NURSE PRACTITIONER

## 2019-01-01 PROCEDURE — 1123F ACP DISCUSS/DSCN MKR DOCD: CPT | Performed by: NURSE PRACTITIONER

## 2019-01-01 PROCEDURE — 99213 OFFICE O/P EST LOW 20 MIN: CPT | Performed by: NURSE PRACTITIONER

## 2019-01-01 PROCEDURE — 6360000002 HC RX W HCPCS: Performed by: NURSE PRACTITIONER

## 2019-01-01 PROCEDURE — G0008 ADMIN INFLUENZA VIRUS VAC: HCPCS | Performed by: NURSE PRACTITIONER

## 2019-01-01 PROCEDURE — 93017 CV STRESS TEST TRACING ONLY: CPT

## 2019-01-01 PROCEDURE — 3430000000 HC RX DIAGNOSTIC RADIOPHARMACEUTICAL: Performed by: NURSE PRACTITIONER

## 2019-01-01 PROCEDURE — G8427 DOCREV CUR MEDS BY ELIG CLIN: HCPCS | Performed by: NURSE PRACTITIONER

## 2019-01-01 PROCEDURE — G8420 CALC BMI NORM PARAMETERS: HCPCS | Performed by: NURSE PRACTITIONER

## 2019-01-01 PROCEDURE — 93000 ELECTROCARDIOGRAM COMPLETE: CPT | Performed by: NURSE PRACTITIONER

## 2019-01-01 PROCEDURE — A9500 TC99M SESTAMIBI: HCPCS | Performed by: NURSE PRACTITIONER

## 2019-01-01 RX ORDER — CLINDAMYCIN HYDROCHLORIDE 300 MG/1
300 CAPSULE ORAL 3 TIMES DAILY
Qty: 21 CAPSULE | Refills: 0 | Status: SHIPPED | OUTPATIENT
Start: 2019-01-01 | End: 2019-01-01

## 2019-01-01 RX ORDER — ATORVASTATIN CALCIUM 40 MG/1
40 TABLET, FILM COATED ORAL NIGHTLY
Qty: 90 TABLET | Refills: 3 | Status: ON HOLD | OUTPATIENT
Start: 2019-01-01 | End: 2020-01-01

## 2019-01-01 RX ORDER — METOPROLOL SUCCINATE 100 MG/1
100 TABLET, EXTENDED RELEASE ORAL DAILY
Qty: 90 TABLET | Refills: 3 | Status: ON HOLD | OUTPATIENT
Start: 2019-01-01 | End: 2020-01-01

## 2019-01-01 RX ORDER — LEVOCETIRIZINE DIHYDROCHLORIDE 5 MG/1
5 TABLET, FILM COATED ORAL NIGHTLY
Qty: 90 TABLET | Refills: 3 | Status: ON HOLD | OUTPATIENT
Start: 2019-01-01 | End: 2020-01-01

## 2019-01-01 RX ORDER — PANTOPRAZOLE SODIUM 40 MG/1
40 TABLET, DELAYED RELEASE ORAL
Qty: 30 TABLET | Refills: 5 | Status: ON HOLD | OUTPATIENT
Start: 2019-01-01 | End: 2020-01-01

## 2019-01-01 RX ADMIN — REGADENOSON 0.4 MG: 0.08 INJECTION, SOLUTION INTRAVENOUS at 10:41

## 2019-01-01 RX ADMIN — TETRAKIS(2-METHOXYISOBUTYLISOCYANIDE)COPPER(I) TETRAFLUOROBORATE 30 MILLICURIE: 1 INJECTION, POWDER, LYOPHILIZED, FOR SOLUTION INTRAVENOUS at 11:20

## 2019-01-01 RX ADMIN — TETRAKIS(2-METHOXYISOBUTYLISOCYANIDE)COPPER(I) TETRAFLUOROBORATE 10 MILLICURIE: 1 INJECTION, POWDER, LYOPHILIZED, FOR SOLUTION INTRAVENOUS at 11:20

## 2019-01-01 ASSESSMENT — PATIENT HEALTH QUESTIONNAIRE - PHQ9
SUM OF ALL RESPONSES TO PHQ QUESTIONS 1-9: 0
SUM OF ALL RESPONSES TO PHQ QUESTIONS 1-9: 0
SUM OF ALL RESPONSES TO PHQ9 QUESTIONS 1 & 2: 0
1. LITTLE INTEREST OR PLEASURE IN DOING THINGS: 0
2. FEELING DOWN, DEPRESSED OR HOPELESS: 0

## 2019-01-01 ASSESSMENT — ENCOUNTER SYMPTOMS
SORE THROAT: 0
RHINORRHEA: 0
DIARRHEA: 0
DIARRHEA: 0
SORE THROAT: 0
CONSTIPATION: 0
COUGH: 0
ABDOMINAL PAIN: 0
CHOKING: 0
SHORTNESS OF BREATH: 1
EYE REDNESS: 0
COUGH: 0
EYE DISCHARGE: 0
WHEEZING: 0
VOMITING: 0
CONSTIPATION: 0
BLOOD IN STOOL: 0
RHINORRHEA: 0
EYE REDNESS: 0

## 2019-02-21 DIAGNOSIS — E11.9 TYPE 2 DIABETES MELLITUS WITHOUT COMPLICATION (HCC): ICD-10-CM

## 2019-05-21 NOTE — PROGRESS NOTES
Dear Drs. No ref. provider found & J LUIS Turner,    Thank you for allowing me to participate in the care of Mr. Gilberto Kolb. He presents today at the [address] in [place]. As you know, Mr. Marie Altman is a 82 y.o. male with history of hypertension, hyperlipidemia, diabetes, COPD and CAD s/p CABG (3V, 2010, Dr. Bishnu Álvarez) who presents with the chief complaint of 6 month follow up with chest pain. He is a patient of Dr. Carol Ann Mancuso. HTN-checks randomly at home. Has not had medications this morning. HLD-PCP manages  CAD/CABG-he denies any chest pain or sob. Dose have fatigue. He otherwise denies SOA, ATKINSON, PND, orthopnea, syncope or near syncope. He has no other complaints. Review of Systems    Constitutional: Negative for fever, chills, diaphoresis, activity change, appetite change,  and unexpected weight change. +fatigue  Eyes: Negative for photophobia, pain, redness and visual disturbance. Respiratory: Negative for apnea, cough, chest tightness, shortness of breath, wheezing and stridor. Cardiovascular: Negative for palpitations and leg swelling. Gastrointestinal: Negative for abdominal distention. Genitourinary: Negative for dysuria, urgency and frequency. Musculoskeletal: Negative for myalgias, arthralgias and gait problem. Skin: Negative for color change, pallor, rash and wound. Neurological: Negative for dizziness, tremors, speech difficulty, weakness and numbness. Hematological: Does not bruise/bleed easily. Psychiatric/Behavioral: Negative.         Past Medical History:   Diagnosis Date    CAD (coronary artery disease)     COPD (chronic obstructive pulmonary disease) ([facility] [address] [facility]     Hyperlipidemia     Hypertension     Type II or unspecified type diabetes mellitus without mention of complication, not stated as uncontrolled        Past Surgical History:   Procedure Laterality Date    CATARACT REMOVAL      CORONARY ARTERY BYPASS GRAFT  2010    3V by Dr. Richardson Read at [address] [place] [address],[address]      from face   [facility]      TYMPANOSTOMY TUBE PLACEMENT         Family History   Problem Relation Age of Onset    Diabetes Father     Heart Disease Father     Cancer Brother        Social History     Socioeconomic History    Marital status:      Spouse name: Not on file    Number of children: Not on file    Years of education: Not on file    Highest education level: Not on file   Occupational History    Not on file   Social Needs    Financial resource strain: Not on file    Food insecurity:     Worry: Not on file     Inability: Not on file    Transportation needs:     Medical: Not on file     Non-medical: Not on file   Tobacco Use    Smoking status: Former Smoker    Smokeless tobacco: Never Used   Substance and Sexual Activity    Alcohol use: No    Drug use: Not on file    Sexual activity: Not on file   Lifestyle    Physical activity:     Days per week: Not on file     Minutes per session: Not on file    Stress: Not on file   Relationships    Social connections:     Talks on phone: Not on file     Gets together: Not on file     Attends Pentecostalism service: Not on file     Active member of club or organization: Not on file     Attends meetings of clubs or organizations: Not on file     Relationship status: Not on file    Intimate partner violence:     Fear of current or ex partner: Not on file     Emotionally abused: Not on file     Physically abused: Not on file     Forced sexual activity: Not on file   Other Topics Concern    Not on file   Social History Narrative    Not on file       No Known Allergies      Current Outpatient Medications:     sitaGLIPtan-metformin (JANUMET)  MG per tablet, Take 1 tablet by mouth 2 times daily (with meals), Disp: 180 tablet, Rfl: 3    irbesartan (AVAPRO) 300 MG tablet, Take 1 tablet by mouth daily, Disp: 90 tablet, Rfl: 3    clopidogrel (PLAVIX) 75 MG tablet, Take one tablet by mouth every day **Please call 30 days in advance for refill**, Disp: 90 tablet, Rfl: 3    metoprolol succinate (TOPROL XL) 100 MG extended release tablet, Take 1 tablet by mouth daily, Disp: 90 tablet, Rfl: 3    levocetirizine (XYZAL) 5 MG tablet, Take 1 tablet by mouth nightly, Disp: 90 tablet, Rfl: 3    atorvastatin (LIPITOR) 40 MG tablet, Take 1 tablet by mouth nightly, Disp: 90 tablet, Rfl: 1    memantine (NAMENDA) 10 MG tablet, Take 1 tablet by mouth 2 times daily, Disp: 180 tablet, Rfl: 3    blood glucose test strips (TRUE METRIX BLOOD GLUCOSE TEST) strip, 1 each by Does not apply route daily, Disp: 100 strip, Rfl: 3    aspirin EC 81 MG EC tablet, Take 1 tablet by mouth daily, Disp: 30 tablet, Rfl: 3    vitamin B-12 (CYANOCOBALAMIN) 500 MCG tablet, Take 500 mcg by mouth daily, Disp: , Rfl:     Glucosamine-Chondroitin (GLUCOSAMINE CHONDR COMPLEX PO), Take 1 tablet by mouth daily, Disp: , Rfl:     Multiple Vitamins-Minerals (THERAPEUTIC MULTIVITAMIN-MINERALS) tablet, Take 1 tablet by mouth daily. , Disp: , Rfl:     PE:  Vitals:    05/21/19 1010   BP: 130/78   Pulse: 74       Estimated body mass index is 21.29 kg/m² as calculated from the following:    Height as of this encounter: 6' (1.829 m). Weight as of this encounter: 157 lb (71.2 kg). Constitutional: He is oriented to person, place, and time. He appears well-developed and well-nourished in no acute distress. Head: Normocephalic and atraumatic. Neck:  Neck supple without JVD present. Cardiovascular: Normal rate, Regular rhythm, normal heart sounds. No murmur ascultated. No gallop and no friction rub. No carotid bruits. No peripheral edema. Pulmonary/Chest:  Lungs clear to auscultation bilaterally without evidence of respiratory distress. He without wheezes. He without rales or ronchi. Musculoskeletal: Normal range of motion. Gait is normal no assitive device.   Neurological: He is alert and

## 2019-08-29 NOTE — PROGRESS NOTES
Yes Charlott Merlin Fiessinger, APRN   memantine (NAMENDA) 10 MG tablet Take 1 tablet by mouth 2 times daily Yes J LUIS Elena   blood glucose test strips (TRUE METRIX BLOOD GLUCOSE TEST) strip 1 each by Does not apply route daily Yes J LUIS Elena   aspirin EC 81 MG EC tablet Take 1 tablet by mouth daily Yes J LUIS Roe   vitamin B-12 (CYANOCOBALAMIN) 500 MCG tablet Take 500 mcg by mouth daily Yes Historical Provider, MD   Glucosamine-Chondroitin (GLUCOSAMINE CHONDR COMPLEX PO) Take 1 tablet by mouth daily Yes Historical Provider, MD   Multiple Vitamins-Minerals (THERAPEUTIC MULTIVITAMIN-MINERALS) tablet Take 1 tablet by mouth daily. Yes Historical Provider, MD       Allergies: Patient has no known allergies. Past Medical History:   Diagnosis Date    CAD (coronary artery disease)     COPD (chronic obstructive pulmonary disease) (HCC)     Hyperlipidemia     Hypertension     Type II or unspecified type diabetes mellitus without mention of complication, not stated as uncontrolled        Past Surgical History:   Procedure Laterality Date    CATARACT REMOVAL      CORONARY ARTERY BYPASS GRAFT  2010    3V by Dr. Kelly Hawkins at [address] [place] [address],[address]      from face    HERNIA REPAIR      KIDNEY STONE SURGERY      TYMPANOSTOMY TUBE PLACEMENT         Social History     Tobacco Use    Smoking status: Former Smoker    Smokeless tobacco: Never Used   Substance Use Topics    Alcohol use: No       Family History   Problem Relation Age of Onset    Diabetes Father     Heart Disease Father     Cancer Brother        Review of Systems   Constitutional: Positive for fatigue. Negative for chills and fever. HENT: Negative for congestion, ear pain, rhinorrhea and sore throat. Eyes: Negative for redness. Respiratory: Positive for shortness of breath (chronic). Negative for cough. Cardiovascular: Negative for chest pain, palpitations and leg swelling.    Gastrointestinal:

## 2019-09-26 NOTE — PROGRESS NOTES
[facility] [address] [place] [address] Rd  Phone [phone]   Fax [phone]      OFFICE VISIT: 9/26/2019    Scott Anthony is a 82 y.o. male whopresents today for his medical conditions/complaints as noted below. Scott Anthony isc/o of Cyst (has been there for along time- comes and goes- Dr Sulma Echevarria and paced it last time- they do not want it lanced this time.)        HPI:      HPI  Cyst (has been there for along time- comes and goes- Dr Sulma Echevarria and packed it last time- they do not want it lanced today.)  It started draining this morning. Some times is gets infected.      Past Medical History:   Diagnosis Date    CAD (coronary artery disease)     [facility]     Hyperlipidemia     Hypertension     Type II or unspecified type diabetes mellitus without mention of complication, not stated as uncontrolled       Past Surgical History:   Procedure Laterality Date    CATARACT REMOVAL      CORONARY ARTERY BYPASS GRAFT  2010    3V by Dr. Victor Hugo Lund at [address] [place] [address],[address]      from face    HERNIA REPAIR      KIDNEY STONE SURGERY      TYMPANOSTOMY TUBE PLACEMENT         Family History   Problem Relation Age of Onset    Diabetes Father     Heart Disease Father     Cancer Brother        Social History     Tobacco Use    Smoking status: Former Smoker    Smokeless tobacco: Never Used   Substance Use Topics    Alcohol use: No      Current Outpatient Medications   Medication Sig Dispense Refill    clindamycin (CLEOCIN) 300 MG capsule Take 1 capsule by mouth 3 times daily for 7 days 21 capsule 0    metFORMIN (GLUCOPHAGE) 500 MG tablet Take 1 tablet by mouth 2 times daily (with meals) 180 tablet 3    linagliptin (TRADJENTA) 5 MG tablet Take 1 tablet by mouth daily 30 tablet 11    atorvastatin (LIPITOR) 40 MG tablet Take 1 tablet by mouth nightly 90 tablet 3    pantoprazole (PROTONIX) 40 MG tablet Take 1 tablet by mouth every morning (before weakness. Hematological: Negative for adenopathy. Psychiatric/Behavioral: Negative for suicidal ideas. Objective:      Physical Exam   Constitutional: He is oriented to person, place, and time. He appears well-developed and well-nourished. HENT:   Head: Normocephalic. Eyes: Conjunctivae are normal.   Neck: Normal range of motion. Neck supple. Cardiovascular: Normal rate. Pulmonary/Chest: Effort normal.   Musculoskeletal: He exhibits no edema. Neurological: He is alert and oriented to person, place, and time. Skin: Skin is warm and dry. Cyst with infection on neck, purulent drainage expressed. Psychiatric: His behavior is normal.   Vitals reviewed. /68   Pulse 67   Temp 97.5 °F (36.4 °C) (Temporal)   Ht 5' 11\" (1.803 m)   Wt 154 lb (69.9 kg)   SpO2 96%   BMI 21.48 kg/m²     Assessment:           ICD-10-CM    1. 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 Orders Placed This Encounter   Procedures    [id number] - CO DRAIN SKIN ABSCESS SIMPLE     Orders Placed This Encounter   Medications    clindamycin (CLEOCIN) 300 MG capsule     Sig: Take 1 capsule by mouth 3 times daily for 7 days     Dispense:  21 capsule     Refill:  0         Discussed use, benefit, and side effectsof prescribed medications. All patient questions answered. Pt voiced understanding. Reviewed health maintenance. .  Patient agreed with treatment plan. Follow up asdirected. There are no Patient Instructions on file for this visit.       Electronically signed by J LUIS Coello on9/26/2019 at 2:59 PM

## 2020-01-01 ENCOUNTER — APPOINTMENT (OUTPATIENT)
Dept: ULTRASOUND IMAGING | Age: 85
DRG: 871 | End: 2020-01-01
Attending: HOSPITALIST
Payer: MEDICARE

## 2020-01-01 ENCOUNTER — HOSPITAL ENCOUNTER (INPATIENT)
Age: 85
LOS: 5 days | Discharge: HOSPICE | DRG: 871 | End: 2020-03-04
Attending: HOSPITALIST | Admitting: FAMILY MEDICINE
Payer: MEDICARE

## 2020-01-01 ENCOUNTER — APPOINTMENT (OUTPATIENT)
Dept: GENERAL RADIOLOGY | Age: 85
DRG: 871 | End: 2020-01-01
Attending: HOSPITALIST
Payer: MEDICARE

## 2020-01-01 ENCOUNTER — NURSE TRIAGE (OUTPATIENT)
Dept: OTHER | Facility: CLINIC | Age: 85
End: 2020-01-01

## 2020-01-01 ENCOUNTER — OFFICE VISIT (OUTPATIENT)
Dept: PRIMARY CARE CLINIC | Age: 85
End: 2020-01-01
Payer: MEDICARE

## 2020-01-01 ENCOUNTER — TELEPHONE (OUTPATIENT)
Dept: PRIMARY CARE CLINIC | Age: 85
End: 2020-01-01

## 2020-01-01 VITALS
RESPIRATION RATE: 18 BRPM | SYSTOLIC BLOOD PRESSURE: 139 MMHG | HEIGHT: 72 IN | DIASTOLIC BLOOD PRESSURE: 70 MMHG | TEMPERATURE: 97.1 F | WEIGHT: 133.9 LBS | OXYGEN SATURATION: 95 % | HEART RATE: 79 BPM | BODY MASS INDEX: 18.14 KG/M2

## 2020-01-01 VITALS
DIASTOLIC BLOOD PRESSURE: 82 MMHG | SYSTOLIC BLOOD PRESSURE: 142 MMHG | WEIGHT: 151.4 LBS | HEART RATE: 76 BPM | HEIGHT: 72 IN | TEMPERATURE: 96.7 F | BODY MASS INDEX: 20.51 KG/M2 | OXYGEN SATURATION: 95 %

## 2020-01-01 VITALS
WEIGHT: 154 LBS | OXYGEN SATURATION: 98 % | BODY MASS INDEX: 21.56 KG/M2 | DIASTOLIC BLOOD PRESSURE: 82 MMHG | SYSTOLIC BLOOD PRESSURE: 148 MMHG | TEMPERATURE: 98.7 F | HEIGHT: 71 IN | HEART RATE: 72 BPM

## 2020-01-01 DIAGNOSIS — N39.0 URINARY TRACT INFECTION WITHOUT HEMATURIA, SITE UNSPECIFIED: ICD-10-CM

## 2020-01-01 DIAGNOSIS — E55.9 VITAMIN D DEFICIENCY: ICD-10-CM

## 2020-01-01 DIAGNOSIS — F03.90 DEMENTIA WITHOUT BEHAVIORAL DISTURBANCE, UNSPECIFIED DEMENTIA TYPE: ICD-10-CM

## 2020-01-01 DIAGNOSIS — E11.9 TYPE 2 DIABETES MELLITUS WITHOUT COMPLICATION, WITHOUT LONG-TERM CURRENT USE OF INSULIN (HCC): ICD-10-CM

## 2020-01-01 LAB
ALBUMIN SERPL-MCNC: 2.8 G/DL (ref 3.5–5.2)
ALP BLD-CCNC: 90 U/L (ref 40–130)
ALT SERPL-CCNC: 18 U/L (ref 5–41)
ANION GAP SERPL CALCULATED.3IONS-SCNC: 14 MMOL/L (ref 7–19)
ANION GAP SERPL CALCULATED.3IONS-SCNC: 18 MMOL/L (ref 7–19)
ANION GAP SERPL CALCULATED.3IONS-SCNC: 18 MMOL/L (ref 7–19)
ANION GAP SERPL CALCULATED.3IONS-SCNC: 19 MMOL/L (ref 7–19)
APPEARANCE FLUID: CLEAR
APTT: 119.2 SEC (ref 26–36.2)
APTT: 32.1 SEC (ref 26–36.2)
APTT: 49.2 SEC (ref 26–36.2)
APTT: 55.1 SEC (ref 26–36.2)
APTT: 58.5 SEC (ref 26–36.2)
APTT: 60.1 SEC (ref 26–36.2)
APTT: 77.8 SEC (ref 26–36.2)
APTT: 81.1 SEC (ref 26–36.2)
APTT: 83.1 SEC (ref 26–36.2)
AST SERPL-CCNC: 35 U/L (ref 5–40)
BANDED NEUTROPHILS RELATIVE PERCENT: 12 % (ref 0–5)
BANDED NEUTROPHILS RELATIVE PERCENT: 29 % (ref 0–5)
BANDED NEUTROPHILS RELATIVE PERCENT: 3 % (ref 0–5)
BASE EXCESS ARTERIAL: 0.3 MMOL/L (ref -2–2)
BASOPHILS ABSOLUTE: 0 K/UL (ref 0–0.2)
BASOPHILS RELATIVE PERCENT: 0 % (ref 0–1)
BILIRUB SERPL-MCNC: 0.8 MG/DL (ref 0.2–1.2)
BILIRUBIN URINE: ABNORMAL
BILIRUBIN, POC: NORMAL
BLOOD CULTURE, ROUTINE: ABNORMAL
BLOOD CULTURE, ROUTINE: ABNORMAL
BLOOD URINE, POC: NORMAL
BLOOD, URINE: ABNORMAL
BUN BLDV-MCNC: 32 MG/DL (ref 8–23)
BUN BLDV-MCNC: 35 MG/DL (ref 8–23)
BUN BLDV-MCNC: 41 MG/DL (ref 8–23)
BUN BLDV-MCNC: 42 MG/DL (ref 8–23)
CALCIUM SERPL-MCNC: 7.3 MG/DL (ref 8.8–10.2)
CALCIUM SERPL-MCNC: 7.8 MG/DL (ref 8.8–10.2)
CALCIUM SERPL-MCNC: 8.9 MG/DL (ref 8.8–10.2)
CALCIUM SERPL-MCNC: 8.9 MG/DL (ref 8.8–10.2)
CARBOXYHEMOGLOBIN ARTERIAL: 1.5 % (ref 0–5)
CASTS: ABNORMAL /LPF
CHLORIDE BLD-SCNC: 114 MMOL/L (ref 98–111)
CHLORIDE BLD-SCNC: 116 MMOL/L (ref 98–111)
CHLORIDE BLD-SCNC: 96 MMOL/L (ref 98–111)
CHLORIDE BLD-SCNC: 99 MMOL/L (ref 98–111)
CHOLESTEROL, TOTAL: 90 MG/DL (ref 160–199)
CLARITY, POC: CLEAR
CLARITY: ABNORMAL
CO2: 17 MMOL/L (ref 22–29)
CO2: 18 MMOL/L (ref 22–29)
CO2: 20 MMOL/L (ref 22–29)
CO2: 20 MMOL/L (ref 22–29)
COLOR, POC: YELLOW
COLOR: ABNORMAL
CREAT SERPL-MCNC: 1.7 MG/DL (ref 0.5–1.2)
CREAT SERPL-MCNC: 1.7 MG/DL (ref 0.5–1.2)
CREAT SERPL-MCNC: 2.1 MG/DL (ref 0.5–1.2)
CREAT SERPL-MCNC: 2.3 MG/DL (ref 0.5–1.2)
CREATININE URINE: 174.6 MG/DL (ref 4.2–622)
CULTURE, BLOOD 2: ABNORMAL
CULTURE, BLOOD 2: ABNORMAL
DIGOXIN LEVEL: 1 NG/ML (ref 0.6–1.2)
DIGOXIN LEVEL: 1.2 NG/ML (ref 0.6–1.2)
DOHLE BODIES: ABNORMAL
DOHLE BODIES: ABNORMAL
EKG P AXIS: 81 DEGREES
EKG P AXIS: 84 DEGREES
EKG P AXIS: 87 DEGREES
EKG P AXIS: NORMAL DEGREES
EKG P AXIS: NORMAL DEGREES
EKG P-R INTERVAL: 144 MS
EKG P-R INTERVAL: 68 MS
EKG P-R INTERVAL: 84 MS
EKG P-R INTERVAL: NORMAL MS
EKG P-R INTERVAL: NORMAL MS
EKG Q-T INTERVAL: 288 MS
EKG Q-T INTERVAL: 344 MS
EKG Q-T INTERVAL: 372 MS
EKG Q-T INTERVAL: 398 MS
EKG Q-T INTERVAL: 422 MS
EKG QRS DURATION: 130 MS
EKG QRS DURATION: 132 MS
EKG QRS DURATION: 138 MS
EKG QRS DURATION: 138 MS
EKG QRS DURATION: 140 MS
EKG QTC CALCULATION (BAZETT): 438 MS
EKG QTC CALCULATION (BAZETT): 454 MS
EKG QTC CALCULATION (BAZETT): 459 MS
EKG QTC CALCULATION (BAZETT): 465 MS
EKG QTC CALCULATION (BAZETT): 494 MS
EKG T AXIS: 100 DEGREES
EKG T AXIS: 79 DEGREES
EKG T AXIS: 79 DEGREES
EKG T AXIS: 90 DEGREES
EKG T AXIS: 98 DEGREES
EOSINOPHILS ABSOLUTE: 0 K/UL (ref 0–0.6)
EOSINOPHILS ABSOLUTE: 0 K/UL (ref 0–0.6)
EOSINOPHILS ABSOLUTE: 0.14 K/UL (ref 0–0.6)
EOSINOPHILS RELATIVE PERCENT: 0 % (ref 0–5)
EOSINOPHILS RELATIVE PERCENT: 0 % (ref 0–5)
EOSINOPHILS RELATIVE PERCENT: 1 % (ref 0–5)
GFR NON-AFRICAN AMERICAN: 27
GFR NON-AFRICAN AMERICAN: 30
GFR NON-AFRICAN AMERICAN: 39
GFR NON-AFRICAN AMERICAN: 39
GLUCOSE BLD-MCNC: 144 MG/DL (ref 70–99)
GLUCOSE BLD-MCNC: 205 MG/DL (ref 70–99)
GLUCOSE BLD-MCNC: 222 MG/DL (ref 70–99)
GLUCOSE BLD-MCNC: 227 MG/DL (ref 70–99)
GLUCOSE BLD-MCNC: 246 MG/DL (ref 74–109)
GLUCOSE BLD-MCNC: 248 MG/DL (ref 70–99)
GLUCOSE BLD-MCNC: 262 MG/DL (ref 70–99)
GLUCOSE BLD-MCNC: 270 MG/DL (ref 70–99)
GLUCOSE BLD-MCNC: 286 MG/DL (ref 70–99)
GLUCOSE BLD-MCNC: 318 MG/DL (ref 70–99)
GLUCOSE BLD-MCNC: 349 MG/DL (ref 74–109)
GLUCOSE BLD-MCNC: 353 MG/DL (ref 74–109)
GLUCOSE BLD-MCNC: 362 MG/DL (ref 74–109)
GLUCOSE BLD-MCNC: 70 MG/DL (ref 70–99)
GLUCOSE BLD-MCNC: 85 MG/DL (ref 70–99)
GLUCOSE BLD-MCNC: 90 MG/DL (ref 70–99)
GLUCOSE BLD-MCNC: 91 MG/DL (ref 70–99)
GLUCOSE URINE, POC: NORMAL
GLUCOSE URINE: 500 MG/DL
HBA1C MFR BLD: 6.9 % (ref 4–6)
HCO3 ARTERIAL: 21.2 MMOL/L (ref 22–26)
HCT VFR BLD CALC: 39.6 % (ref 42–52)
HCT VFR BLD CALC: 48.4 % (ref 42–52)
HCT VFR BLD CALC: 55.5 % (ref 42–52)
HDLC SERPL-MCNC: 41 MG/DL (ref 55–121)
HEMOGLOBIN, ART, EXTENDED: 17.7 G/DL (ref 14–18)
HEMOGLOBIN: 13.4 G/DL (ref 14–18)
HEMOGLOBIN: 15.9 G/DL (ref 14–18)
HEMOGLOBIN: 18 G/DL (ref 14–18)
HYPOCHROMIA: ABNORMAL
IMMATURE GRANULOCYTES #: 0.1 K/UL
INR BLD: 1.53 (ref 0.88–1.18)
KETONES, POC: NORMAL
KETONES, URINE: ABNORMAL MG/DL
LACTIC ACID: 3.5 MMOL/L (ref 0.5–1.9)
LACTIC ACID: 3.5 MMOL/L (ref 0.5–1.9)
LDL CHOLESTEROL CALCULATED: 31 MG/DL
LEUKOCYTE EST, POC: NORMAL
LEUKOCYTE ESTERASE, URINE: NEGATIVE
LV EF: 50 %
LVEF MODALITY: NORMAL
LYMPHOCYTES ABSOLUTE: 0.5 K/UL (ref 1.1–4.5)
LYMPHOCYTES ABSOLUTE: 1 K/UL (ref 1.1–4.5)
LYMPHOCYTES ABSOLUTE: 1.8 K/UL (ref 1.1–4.5)
LYMPHOCYTES RELATIVE PERCENT: 11 % (ref 20–40)
LYMPHOCYTES RELATIVE PERCENT: 4 % (ref 20–40)
LYMPHOCYTES RELATIVE PERCENT: 7 % (ref 20–40)
MACROCYTES: ABNORMAL
MAGNESIUM: 1.5 MG/DL (ref 1.6–2.4)
MAGNESIUM: 1.7 MG/DL (ref 1.6–2.4)
MAGNESIUM: 1.8 MG/DL (ref 1.6–2.4)
MAGNESIUM: 2 MG/DL (ref 1.6–2.4)
MAGNESIUM: 2.4 MG/DL (ref 1.6–2.4)
MCH RBC QN AUTO: 32.8 PG (ref 27–31)
MCH RBC QN AUTO: 32.8 PG (ref 27–31)
MCH RBC QN AUTO: 33 PG (ref 27–31)
MCHC RBC AUTO-ENTMCNC: 32.4 G/DL (ref 33–37)
MCHC RBC AUTO-ENTMCNC: 32.9 G/DL (ref 33–37)
MCHC RBC AUTO-ENTMCNC: 33.8 G/DL (ref 33–37)
MCV RBC AUTO: 100.4 FL (ref 80–94)
MCV RBC AUTO: 101.3 FL (ref 80–94)
MCV RBC AUTO: 96.8 FL (ref 80–94)
METHEMOGLOBIN ARTERIAL: 1.6 %
MONOCYTES ABSOLUTE: 0.1 K/UL (ref 0–0.9)
MONOCYTES ABSOLUTE: 0.5 K/UL (ref 0–0.9)
MONOCYTES ABSOLUTE: 1.6 K/UL (ref 0–0.9)
MONOCYTES RELATIVE PERCENT: 1 % (ref 0–10)
MONOCYTES RELATIVE PERCENT: 11 % (ref 0–10)
MONOCYTES RELATIVE PERCENT: 3 % (ref 0–10)
NEUTROPHILS ABSOLUTE: 11.6 K/UL (ref 1.5–7.5)
NEUTROPHILS ABSOLUTE: 12.7 K/UL (ref 1.5–7.5)
NEUTROPHILS ABSOLUTE: 14 K/UL (ref 1.5–7.5)
NEUTROPHILS RELATIVE PERCENT: 53 % (ref 50–65)
NEUTROPHILS RELATIVE PERCENT: 82 % (ref 50–65)
NEUTROPHILS RELATIVE PERCENT: 83 % (ref 50–65)
NITRITE, POC: NORMAL
NITRITE, URINE: NEGATIVE
O2 CONTENT ARTERIAL: 22.1 ML/DL
O2 SAT, ARTERIAL: 89.2 %
O2 THERAPY: ABNORMAL
ORGANISM: ABNORMAL
ORGANISM: ABNORMAL
OSMOLALITY URINE: 589 MOSM/KG (ref 250–1200)
OVALOCYTES: ABNORMAL
PARATHYROID HORMONE INTACT: 75.8 PG/ML (ref 15–65)
PCO2 ARTERIAL: 26 MMHG (ref 35–45)
PDW BLD-RTO: 12.6 % (ref 11.5–14.5)
PDW BLD-RTO: 13 % (ref 11.5–14.5)
PDW BLD-RTO: 13.1 % (ref 11.5–14.5)
PERFORMED ON: ABNORMAL
PERFORMED ON: NORMAL
PH ARTERIAL: 7.52 (ref 7.35–7.45)
PH UA: 5.5 (ref 5–8)
PH, POC: 6
PHOSPHORUS: 2.6 MG/DL (ref 2.5–4.5)
PHOSPHORUS: 2.7 MG/DL (ref 2.5–4.5)
PLATELET # BLD: 142 K/UL (ref 130–400)
PLATELET # BLD: 179 K/UL (ref 130–400)
PLATELET # BLD: 223 K/UL (ref 130–400)
PLATELET SLIDE REVIEW: ADEQUATE
PMV BLD AUTO: 11.5 FL (ref 9.4–12.4)
PMV BLD AUTO: 11.7 FL (ref 9.4–12.4)
PMV BLD AUTO: 12 FL (ref 9.4–12.4)
PO2 ARTERIAL: 50 MMHG (ref 80–100)
POLYCHROMASIA: ABNORMAL
POTASSIUM REFLEX MAGNESIUM: 3 MMOL/L (ref 3.5–5)
POTASSIUM REFLEX MAGNESIUM: 3.4 MMOL/L (ref 3.5–5)
POTASSIUM REFLEX MAGNESIUM: 4.1 MMOL/L (ref 3.5–5)
POTASSIUM SERPL-SCNC: 3 MMOL/L (ref 3.5–5)
POTASSIUM SERPL-SCNC: 3.4 MMOL/L (ref 3.5–5)
POTASSIUM SERPL-SCNC: 3.4 MMOL/L (ref 3.5–5)
POTASSIUM SERPL-SCNC: 4.1 MMOL/L (ref 3.5–5)
POTASSIUM, WHOLE BLOOD: 3.2
PRO-BNP: 9204 PG/ML (ref 0–1800)
PROTEIN UA: 100 MG/DL
PROTEIN, POC: 100
PROTHROMBIN TIME: 18.5 SEC (ref 12–14.6)
RBC # BLD: 4.09 M/UL (ref 4.7–6.1)
RBC # BLD: 4.82 M/UL (ref 4.7–6.1)
RBC # BLD: 5.48 M/UL (ref 4.7–6.1)
RBC UA: ABNORMAL /HPF (ref 0–2)
REASON FOR REJECTION: NORMAL
REJECTED TEST: NORMAL
SODIUM BLD-SCNC: 130 MMOL/L (ref 136–145)
SODIUM BLD-SCNC: 137 MMOL/L (ref 136–145)
SODIUM BLD-SCNC: 150 MMOL/L (ref 136–145)
SODIUM BLD-SCNC: 152 MMOL/L (ref 136–145)
SODIUM URINE: <20 MMOL/L
SPECIFIC GRAVITY UA: 1.03 (ref 1–1.03)
SPECIFIC GRAVITY, POC: 1.02
TOTAL CK: 388 U/L (ref 39–308)
TOTAL PROTEIN: 6.3 G/DL (ref 6.6–8.7)
TOXIC GRANULATION: ABNORMAL
TOXIC GRANULATION: ABNORMAL
TRIGL SERPL-MCNC: 89 MG/DL (ref 0–149)
TROPONIN: 0.01 NG/ML (ref 0–0.03)
TROPONIN: 0.02 NG/ML (ref 0–0.03)
TROPONIN: <0.01 NG/ML (ref 0–0.03)
TROPONIN: <0.01 NG/ML (ref 0–0.03)
TSH REFLEX FT4: 0.8 UIU/ML (ref 0.35–5.5)
URIC ACID, SERUM: 4.3 MG/DL (ref 3.4–7)
URINE CULTURE, ROUTINE: NORMAL
URINE REFLEX TO CULTURE: ABNORMAL
UROBILINOGEN, POC: 1
UROBILINOGEN, URINE: 1 E.U./DL
VACUOLATED NEUTROPHILS: ABNORMAL
VACUOLATED NEUTROPHILS: ABNORMAL
VANCOMYCIN RANDOM: 10.8 UG/ML
VANCOMYCIN RANDOM: 8.7 UG/ML
VANCOMYCIN TROUGH: 6 UG/ML (ref 10–20)
WBC # BLD: 13.5 K/UL (ref 4.8–10.8)
WBC # BLD: 14.2 K/UL (ref 4.8–10.8)
WBC # BLD: 16.3 K/UL (ref 4.8–10.8)

## 2020-01-01 PROCEDURE — 82947 ASSAY GLUCOSE BLOOD QUANT: CPT

## 2020-01-01 PROCEDURE — 36415 COLL VENOUS BLD VENIPUNCTURE: CPT

## 2020-01-01 PROCEDURE — 2500000003 HC RX 250 WO HCPCS: Performed by: INTERNAL MEDICINE

## 2020-01-01 PROCEDURE — 85730 THROMBOPLASTIN TIME PARTIAL: CPT

## 2020-01-01 PROCEDURE — G8420 CALC BMI NORM PARAMETERS: HCPCS | Performed by: NURSE PRACTITIONER

## 2020-01-01 PROCEDURE — 4040F PNEUMOC VAC/ADMIN/RCVD: CPT | Performed by: NURSE PRACTITIONER

## 2020-01-01 PROCEDURE — 1123F ACP DISCUSS/DSCN MKR DOCD: CPT | Performed by: NURSE PRACTITIONER

## 2020-01-01 PROCEDURE — 2500000003 HC RX 250 WO HCPCS: Performed by: HOSPITALIST

## 2020-01-01 PROCEDURE — 92526 ORAL FUNCTION THERAPY: CPT

## 2020-01-01 PROCEDURE — 6360000002 HC RX W HCPCS: Performed by: HOSPITALIST

## 2020-01-01 PROCEDURE — 83605 ASSAY OF LACTIC ACID: CPT

## 2020-01-01 PROCEDURE — G8482 FLU IMMUNIZE ORDER/ADMIN: HCPCS | Performed by: NURSE PRACTITIONER

## 2020-01-01 PROCEDURE — 2580000003 HC RX 258: Performed by: HOSPITALIST

## 2020-01-01 PROCEDURE — 1036F TOBACCO NON-USER: CPT | Performed by: NURSE PRACTITIONER

## 2020-01-01 PROCEDURE — 82803 BLOOD GASES ANY COMBINATION: CPT

## 2020-01-01 PROCEDURE — 83735 ASSAY OF MAGNESIUM: CPT

## 2020-01-01 PROCEDURE — 36600 WITHDRAWAL OF ARTERIAL BLOOD: CPT

## 2020-01-01 PROCEDURE — 94640 AIRWAY INHALATION TREATMENT: CPT

## 2020-01-01 PROCEDURE — 2580000003 HC RX 258: Performed by: INTERNAL MEDICINE

## 2020-01-01 PROCEDURE — 80053 COMPREHEN METABOLIC PANEL: CPT

## 2020-01-01 PROCEDURE — 51798 US URINE CAPACITY MEASURE: CPT

## 2020-01-01 PROCEDURE — 6360000002 HC RX W HCPCS: Performed by: INTERNAL MEDICINE

## 2020-01-01 PROCEDURE — 2700000000 HC OXYGEN THERAPY PER DAY

## 2020-01-01 PROCEDURE — 87040 BLOOD CULTURE FOR BACTERIA: CPT

## 2020-01-01 PROCEDURE — 85025 COMPLETE CBC W/AUTO DIFF WBC: CPT

## 2020-01-01 PROCEDURE — 99214 OFFICE O/P EST MOD 30 MIN: CPT | Performed by: NURSE PRACTITIONER

## 2020-01-01 PROCEDURE — G8427 DOCREV CUR MEDS BY ELIG CLIN: HCPCS | Performed by: NURSE PRACTITIONER

## 2020-01-01 PROCEDURE — 2500000003 HC RX 250 WO HCPCS

## 2020-01-01 PROCEDURE — 83970 ASSAY OF PARATHORMONE: CPT

## 2020-01-01 PROCEDURE — 84132 ASSAY OF SERUM POTASSIUM: CPT

## 2020-01-01 PROCEDURE — 92507 TX SP LANG VOICE COMM INDIV: CPT

## 2020-01-01 PROCEDURE — 2100000000 HC CCU R&B

## 2020-01-01 PROCEDURE — 84550 ASSAY OF BLOOD/URIC ACID: CPT

## 2020-01-01 PROCEDURE — 87186 SC STD MICRODIL/AGAR DIL: CPT

## 2020-01-01 PROCEDURE — 99232 SBSQ HOSP IP/OBS MODERATE 35: CPT | Performed by: INTERNAL MEDICINE

## 2020-01-01 PROCEDURE — 86403 PARTICLE AGGLUT ANTBDY SCRN: CPT

## 2020-01-01 PROCEDURE — 92610 EVALUATE SWALLOWING FUNCTION: CPT

## 2020-01-01 PROCEDURE — 84300 ASSAY OF URINE SODIUM: CPT

## 2020-01-01 PROCEDURE — 83880 ASSAY OF NATRIURETIC PEPTIDE: CPT

## 2020-01-01 PROCEDURE — 6370000000 HC RX 637 (ALT 250 FOR IP): Performed by: HOSPITALIST

## 2020-01-01 PROCEDURE — 80162 ASSAY OF DIGOXIN TOTAL: CPT

## 2020-01-01 PROCEDURE — 1210000000 HC MED SURG R&B

## 2020-01-01 PROCEDURE — 84443 ASSAY THYROID STIM HORMONE: CPT

## 2020-01-01 PROCEDURE — 81001 URINALYSIS AUTO W/SCOPE: CPT

## 2020-01-01 PROCEDURE — 93005 ELECTROCARDIOGRAM TRACING: CPT

## 2020-01-01 PROCEDURE — 84100 ASSAY OF PHOSPHORUS: CPT

## 2020-01-01 PROCEDURE — 80048 BASIC METABOLIC PNL TOTAL CA: CPT

## 2020-01-01 PROCEDURE — 83036 HEMOGLOBIN GLYCOSYLATED A1C: CPT

## 2020-01-01 PROCEDURE — 93306 TTE W/DOPPLER COMPLETE: CPT

## 2020-01-01 PROCEDURE — 99213 OFFICE O/P EST LOW 20 MIN: CPT | Performed by: NURSE PRACTITIONER

## 2020-01-01 PROCEDURE — 92522 EVALUATE SPEECH PRODUCTION: CPT

## 2020-01-01 PROCEDURE — 97530 THERAPEUTIC ACTIVITIES: CPT

## 2020-01-01 PROCEDURE — 83935 ASSAY OF URINE OSMOLALITY: CPT

## 2020-01-01 PROCEDURE — 99221 1ST HOSP IP/OBS SF/LOW 40: CPT | Performed by: INTERNAL MEDICINE

## 2020-01-01 PROCEDURE — 85610 PROTHROMBIN TIME: CPT

## 2020-01-01 PROCEDURE — 71045 X-RAY EXAM CHEST 1 VIEW: CPT

## 2020-01-01 PROCEDURE — 80202 ASSAY OF VANCOMYCIN: CPT

## 2020-01-01 PROCEDURE — 82550 ASSAY OF CK (CPK): CPT

## 2020-01-01 PROCEDURE — 82570 ASSAY OF URINE CREATININE: CPT

## 2020-01-01 PROCEDURE — 81002 URINALYSIS NONAUTO W/O SCOPE: CPT | Performed by: NURSE PRACTITIONER

## 2020-01-01 PROCEDURE — 97162 PT EVAL MOD COMPLEX 30 MIN: CPT

## 2020-01-01 PROCEDURE — 76770 US EXAM ABDO BACK WALL COMP: CPT

## 2020-01-01 PROCEDURE — 84484 ASSAY OF TROPONIN QUANT: CPT

## 2020-01-01 PROCEDURE — 80061 LIPID PANEL: CPT

## 2020-01-01 RX ORDER — PANTOPRAZOLE SODIUM 40 MG/1
40 TABLET, DELAYED RELEASE ORAL
Qty: 30 TABLET | Refills: 0 | Status: SHIPPED | OUTPATIENT
Start: 2020-01-01

## 2020-01-01 RX ORDER — HEPARIN SODIUM 1000 [USP'U]/ML
30 INJECTION, SOLUTION INTRAVENOUS; SUBCUTANEOUS PRN
Status: DISCONTINUED | OUTPATIENT
Start: 2020-01-01 | End: 2020-01-01 | Stop reason: HOSPADM

## 2020-01-01 RX ORDER — POTASSIUM CHLORIDE 20MEQ/15ML
20 LIQUID (ML) ORAL DAILY
Status: ON HOLD | COMMUNITY
End: 2020-01-01 | Stop reason: HOSPADM

## 2020-01-01 RX ORDER — ACETAMINOPHEN 650 MG/1
650 SUPPOSITORY RECTAL EVERY 4 HOURS PRN
Status: DISCONTINUED | OUTPATIENT
Start: 2020-01-01 | End: 2020-01-01 | Stop reason: HOSPADM

## 2020-01-01 RX ORDER — NICOTINE POLACRILEX 4 MG
15 LOZENGE BUCCAL PRN
Status: DISCONTINUED | OUTPATIENT
Start: 2020-01-01 | End: 2020-01-01 | Stop reason: HOSPADM

## 2020-01-01 RX ORDER — RIVASTIGMINE 4.6 MG/24H
1 PATCH, EXTENDED RELEASE TRANSDERMAL DAILY
Qty: 30 PATCH | Refills: 3 | Status: ON HOLD | OUTPATIENT
Start: 2020-01-01 | End: 2020-01-01

## 2020-01-01 RX ORDER — RIVASTIGMINE 4.6 MG/24H
1 PATCH, EXTENDED RELEASE TRANSDERMAL DAILY
Status: DISCONTINUED | OUTPATIENT
Start: 2020-01-01 | End: 2020-01-01

## 2020-01-01 RX ORDER — POLYETHYLENE GLYCOL 3350 17 G/17G
17 POWDER, FOR SOLUTION ORAL DAILY PRN
Qty: 527 G | Refills: 0 | Status: SHIPPED | OUTPATIENT
Start: 2020-01-01 | End: 2020-04-03

## 2020-01-01 RX ORDER — DEXTROSE MONOHYDRATE 50 MG/ML
100 INJECTION, SOLUTION INTRAVENOUS PRN
Status: DISCONTINUED | OUTPATIENT
Start: 2020-01-01 | End: 2020-01-01 | Stop reason: HOSPADM

## 2020-01-01 RX ORDER — MORPHINE SULFATE 4 MG/ML
2 INJECTION, SOLUTION INTRAMUSCULAR; INTRAVENOUS EVERY 4 HOURS PRN
Status: DISCONTINUED | OUTPATIENT
Start: 2020-01-01 | End: 2020-01-01 | Stop reason: HOSPADM

## 2020-01-01 RX ORDER — HEPARIN SODIUM 1000 [USP'U]/ML
60 INJECTION, SOLUTION INTRAVENOUS; SUBCUTANEOUS ONCE
Status: COMPLETED | OUTPATIENT
Start: 2020-01-01 | End: 2020-01-01

## 2020-01-01 RX ORDER — OSELTAMIVIR PHOSPHATE 75 MG/1
75 CAPSULE ORAL 2 TIMES DAILY
Status: ON HOLD | COMMUNITY
Start: 2020-01-01 | End: 2020-01-01 | Stop reason: HOSPADM

## 2020-01-01 RX ORDER — NALOXONE HYDROCHLORIDE 0.4 MG/ML
0.4 INJECTION, SOLUTION INTRAMUSCULAR; INTRAVENOUS; SUBCUTANEOUS PRN
Status: DISCONTINUED | OUTPATIENT
Start: 2020-01-01 | End: 2020-01-01 | Stop reason: HOSPADM

## 2020-01-01 RX ORDER — FUROSEMIDE 10 MG/ML
40 INJECTION INTRAMUSCULAR; INTRAVENOUS ONCE
Status: DISCONTINUED | OUTPATIENT
Start: 2020-01-01 | End: 2020-01-01 | Stop reason: HOSPADM

## 2020-01-01 RX ORDER — LANOLIN ALCOHOL/MO/W.PET/CERES
3 CREAM (GRAM) TOPICAL NIGHTLY PRN
Status: DISCONTINUED | OUTPATIENT
Start: 2020-01-01 | End: 2020-01-01 | Stop reason: HOSPADM

## 2020-01-01 RX ORDER — CLOPIDOGREL BISULFATE 75 MG/1
TABLET ORAL
Qty: 90 TABLET | Refills: 2 | Status: SHIPPED | OUTPATIENT
Start: 2020-01-01

## 2020-01-01 RX ORDER — PANTOPRAZOLE SODIUM 40 MG/1
40 TABLET, DELAYED RELEASE ORAL
Status: DISCONTINUED | OUTPATIENT
Start: 2020-01-01 | End: 2020-01-01 | Stop reason: HOSPADM

## 2020-01-01 RX ORDER — DIGOXIN 0.25 MG/ML
500 INJECTION INTRAMUSCULAR; INTRAVENOUS ONCE
Status: COMPLETED | OUTPATIENT
Start: 2020-01-01 | End: 2020-01-01

## 2020-01-01 RX ORDER — MORPHINE SULFATE 4 MG/ML
1 INJECTION, SOLUTION INTRAMUSCULAR; INTRAVENOUS EVERY 4 HOURS PRN
Status: DISCONTINUED | OUTPATIENT
Start: 2020-01-01 | End: 2020-01-01 | Stop reason: HOSPADM

## 2020-01-01 RX ORDER — MEMANTINE HYDROCHLORIDE 10 MG/1
10 TABLET ORAL 2 TIMES DAILY
Qty: 180 TABLET | Refills: 2 | Status: ON HOLD | OUTPATIENT
Start: 2020-01-01 | End: 2020-01-01

## 2020-01-01 RX ORDER — M-VIT,TX,IRON,MINS/CALC/FOLIC 27MG-0.4MG
1 TABLET ORAL DAILY
Status: DISCONTINUED | OUTPATIENT
Start: 2020-01-01 | End: 2020-01-01 | Stop reason: HOSPADM

## 2020-01-01 RX ORDER — METOPROLOL TARTRATE 5 MG/5ML
2.5 INJECTION INTRAVENOUS EVERY 6 HOURS
Status: DISCONTINUED | OUTPATIENT
Start: 2020-01-01 | End: 2020-01-01 | Stop reason: HOSPADM

## 2020-01-01 RX ORDER — SODIUM PHOSPHATE,MONO-DIBASIC 19G-7G/118
1 ENEMA (ML) RECTAL DAILY
Status: DISCONTINUED | OUTPATIENT
Start: 2020-01-01 | End: 2020-01-01 | Stop reason: CLARIF

## 2020-01-01 RX ORDER — LEVALBUTEROL INHALATION SOLUTION 0.63 MG/3ML
0.63 SOLUTION RESPIRATORY (INHALATION) EVERY 4 HOURS
Status: DISCONTINUED | OUTPATIENT
Start: 2020-01-01 | End: 2020-01-01 | Stop reason: HOSPADM

## 2020-01-01 RX ORDER — METOPROLOL TARTRATE 5 MG/5ML
INJECTION INTRAVENOUS
Status: COMPLETED
Start: 2020-01-01 | End: 2020-01-01

## 2020-01-01 RX ORDER — ATORVASTATIN CALCIUM 40 MG/1
40 TABLET, FILM COATED ORAL NIGHTLY
Status: DISCONTINUED | OUTPATIENT
Start: 2020-01-01 | End: 2020-01-01 | Stop reason: HOSPADM

## 2020-01-01 RX ORDER — HEPARIN SODIUM 10000 [USP'U]/100ML
12 INJECTION, SOLUTION INTRAVENOUS CONTINUOUS
Status: DISCONTINUED | OUTPATIENT
Start: 2020-01-01 | End: 2020-01-01

## 2020-01-01 RX ORDER — POLYETHYLENE GLYCOL 3350 17 G/17G
17 POWDER, FOR SOLUTION ORAL DAILY PRN
Status: DISCONTINUED | OUTPATIENT
Start: 2020-01-01 | End: 2020-01-01 | Stop reason: HOSPADM

## 2020-01-01 RX ORDER — SODIUM CHLORIDE, SODIUM LACTATE, POTASSIUM CHLORIDE, AND CALCIUM CHLORIDE .6; .31; .03; .02 G/100ML; G/100ML; G/100ML; G/100ML
500 INJECTION, SOLUTION INTRAVENOUS ONCE
Status: COMPLETED | OUTPATIENT
Start: 2020-01-01 | End: 2020-01-01

## 2020-01-01 RX ORDER — IRBESARTAN 300 MG/1
300 TABLET ORAL DAILY
Qty: 90 TABLET | Refills: 2 | Status: ON HOLD
Start: 2020-01-01 | End: 2020-01-01 | Stop reason: HOSPADM

## 2020-01-01 RX ORDER — METOPROLOL TARTRATE 5 MG/5ML
5 INJECTION INTRAVENOUS ONCE
Status: COMPLETED | OUTPATIENT
Start: 2020-01-01 | End: 2020-01-01

## 2020-01-01 RX ORDER — INSULIN GLARGINE 100 [IU]/ML
15 INJECTION, SOLUTION SUBCUTANEOUS NIGHTLY
Status: DISCONTINUED | OUTPATIENT
Start: 2020-01-01 | End: 2020-01-01 | Stop reason: HOSPADM

## 2020-01-01 RX ORDER — HEPARIN SODIUM 1000 [USP'U]/ML
60 INJECTION, SOLUTION INTRAVENOUS; SUBCUTANEOUS PRN
Status: DISCONTINUED | OUTPATIENT
Start: 2020-01-01 | End: 2020-01-01 | Stop reason: HOSPADM

## 2020-01-01 RX ORDER — DILTIAZEM HYDROCHLORIDE 5 MG/ML
10 INJECTION INTRAVENOUS ONCE
Status: COMPLETED | OUTPATIENT
Start: 2020-01-01 | End: 2020-01-01

## 2020-01-01 RX ORDER — METOPROLOL TARTRATE 50 MG/1
50 TABLET, FILM COATED ORAL 2 TIMES DAILY
COMMUNITY

## 2020-01-01 RX ORDER — ONDANSETRON 4 MG/1
4 TABLET, ORALLY DISINTEGRATING ORAL EVERY 8 HOURS PRN
Qty: 12 TABLET | Refills: 0 | Status: SHIPPED | OUTPATIENT
Start: 2020-01-01

## 2020-01-01 RX ORDER — ASPIRIN 81 MG/1
81 TABLET ORAL DAILY
Status: DISCONTINUED | OUTPATIENT
Start: 2020-01-01 | End: 2020-01-01 | Stop reason: HOSPADM

## 2020-01-01 RX ORDER — LANOLIN ALCOHOL/MO/W.PET/CERES
3 CREAM (GRAM) TOPICAL EVERY EVENING
Qty: 30 TABLET | Refills: 0 | Status: SHIPPED | OUTPATIENT
Start: 2020-01-01 | End: 2020-04-03

## 2020-01-01 RX ORDER — LEVALBUTEROL INHALATION SOLUTION 0.63 MG/3ML
0.63 SOLUTION RESPIRATORY (INHALATION) EVERY 6 HOURS PRN
Qty: 360 ML | Refills: 0 | Status: SHIPPED | OUTPATIENT
Start: 2020-01-01

## 2020-01-01 RX ORDER — ONDANSETRON 2 MG/ML
4 INJECTION INTRAMUSCULAR; INTRAVENOUS EVERY 6 HOURS PRN
Status: DISCONTINUED | OUTPATIENT
Start: 2020-01-01 | End: 2020-01-01 | Stop reason: HOSPADM

## 2020-01-01 RX ORDER — METOPROLOL TARTRATE 5 MG/5ML
2.5 INJECTION INTRAVENOUS ONCE
Status: COMPLETED | OUTPATIENT
Start: 2020-01-01 | End: 2020-01-01

## 2020-01-01 RX ORDER — DEXTROSE MONOHYDRATE 50 MG/ML
INJECTION, SOLUTION INTRAVENOUS CONTINUOUS
Status: DISCONTINUED | OUTPATIENT
Start: 2020-01-01 | End: 2020-01-01

## 2020-01-01 RX ORDER — CLOPIDOGREL BISULFATE 75 MG/1
75 TABLET ORAL DAILY
Status: DISCONTINUED | OUTPATIENT
Start: 2020-01-01 | End: 2020-01-01 | Stop reason: HOSPADM

## 2020-01-01 RX ORDER — CHOLECALCIFEROL (VITAMIN D3) 125 MCG
500 CAPSULE ORAL DAILY
Status: DISCONTINUED | OUTPATIENT
Start: 2020-01-01 | End: 2020-01-01 | Stop reason: HOSPADM

## 2020-01-01 RX ORDER — SODIUM CHLORIDE, SODIUM LACTATE, POTASSIUM CHLORIDE, CALCIUM CHLORIDE 600; 310; 30; 20 MG/100ML; MG/100ML; MG/100ML; MG/100ML
INJECTION, SOLUTION INTRAVENOUS CONTINUOUS
Status: DISCONTINUED | OUTPATIENT
Start: 2020-01-01 | End: 2020-01-01

## 2020-01-01 RX ORDER — SODIUM CHLORIDE 0.9 % (FLUSH) 0.9 %
10 SYRINGE (ML) INJECTION EVERY 12 HOURS SCHEDULED
Status: DISCONTINUED | OUTPATIENT
Start: 2020-01-01 | End: 2020-01-01 | Stop reason: HOSPADM

## 2020-01-01 RX ORDER — MAGNESIUM SULFATE 1 G/100ML
1 INJECTION INTRAVENOUS ONCE
Status: COMPLETED | OUTPATIENT
Start: 2020-01-01 | End: 2020-01-01

## 2020-01-01 RX ORDER — MORPHINE SULFATE 4 MG/ML
0.5 INJECTION, SOLUTION INTRAMUSCULAR; INTRAVENOUS EVERY 4 HOURS PRN
Status: DISCONTINUED | OUTPATIENT
Start: 2020-01-01 | End: 2020-01-01 | Stop reason: HOSPADM

## 2020-01-01 RX ORDER — ONDANSETRON 4 MG/1
4 TABLET, ORALLY DISINTEGRATING ORAL EVERY 8 HOURS PRN
Status: DISCONTINUED | OUTPATIENT
Start: 2020-01-01 | End: 2020-01-01 | Stop reason: HOSPADM

## 2020-01-01 RX ORDER — POTASSIUM CHLORIDE 7.45 MG/ML
10 INJECTION INTRAVENOUS
Status: COMPLETED | OUTPATIENT
Start: 2020-01-01 | End: 2020-01-01

## 2020-01-01 RX ORDER — OSELTAMIVIR PHOSPHATE 6 MG/ML
30 FOR SUSPENSION ORAL DAILY
Status: DISCONTINUED | OUTPATIENT
Start: 2020-01-01 | End: 2020-01-01 | Stop reason: HOSPADM

## 2020-01-01 RX ORDER — MEMANTINE HYDROCHLORIDE 5 MG/1
10 TABLET ORAL 2 TIMES DAILY
Status: DISCONTINUED | OUTPATIENT
Start: 2020-01-01 | End: 2020-01-01

## 2020-01-01 RX ORDER — MAGNESIUM SULFATE 1 G/100ML
1 INJECTION INTRAVENOUS
Status: COMPLETED | OUTPATIENT
Start: 2020-01-01 | End: 2020-01-01

## 2020-01-01 RX ORDER — HEPARIN SODIUM 5000 [USP'U]/ML
5000 INJECTION, SOLUTION INTRAVENOUS; SUBCUTANEOUS EVERY 8 HOURS SCHEDULED
Status: DISCONTINUED | OUTPATIENT
Start: 2020-01-01 | End: 2020-01-01

## 2020-01-01 RX ORDER — DIGOXIN 0.25 MG/ML
125 INJECTION INTRAMUSCULAR; INTRAVENOUS DAILY
Status: DISCONTINUED | OUTPATIENT
Start: 2020-01-01 | End: 2020-01-01

## 2020-01-01 RX ORDER — DEXTROSE MONOHYDRATE 25 G/50ML
12.5 INJECTION, SOLUTION INTRAVENOUS PRN
Status: DISCONTINUED | OUTPATIENT
Start: 2020-01-01 | End: 2020-01-01 | Stop reason: HOSPADM

## 2020-01-01 RX ORDER — SODIUM CHLORIDE 0.9 % (FLUSH) 0.9 %
10 SYRINGE (ML) INJECTION PRN
Status: DISCONTINUED | OUTPATIENT
Start: 2020-01-01 | End: 2020-01-01 | Stop reason: HOSPADM

## 2020-01-01 RX ORDER — IPRATROPIUM BROMIDE AND ALBUTEROL SULFATE 2.5; .5 MG/3ML; MG/3ML
1 SOLUTION RESPIRATORY (INHALATION)
Status: DISCONTINUED | OUTPATIENT
Start: 2020-01-01 | End: 2020-01-01

## 2020-01-01 RX ADMIN — POTASSIUM CHLORIDE 10 MEQ: 7.46 INJECTION, SOLUTION INTRAVENOUS at 06:57

## 2020-01-01 RX ADMIN — MAGNESIUM SULFATE HEPTAHYDRATE 1 G: 1 INJECTION, SOLUTION INTRAVENOUS at 03:35

## 2020-01-01 RX ADMIN — HEPARIN SODIUM 3640 UNITS: 1000 INJECTION INTRAVENOUS; SUBCUTANEOUS at 15:02

## 2020-01-01 RX ADMIN — PIPERACILLIN SODIUM AND TAZOBACTAM SODIUM 3.38 G: 3; .375 INJECTION, POWDER, LYOPHILIZED, FOR SOLUTION INTRAVENOUS at 20:55

## 2020-01-01 RX ADMIN — LEVALBUTEROL HYDROCHLORIDE 0.63 MG: 0.63 SOLUTION RESPIRATORY (INHALATION) at 06:19

## 2020-01-01 RX ADMIN — DILTIAZEM HYDROCHLORIDE 20 MG/HR: 5 INJECTION INTRAVENOUS at 01:09

## 2020-01-01 RX ADMIN — INSULIN GLARGINE 15 UNITS: 100 INJECTION, SOLUTION SUBCUTANEOUS at 00:21

## 2020-01-01 RX ADMIN — AMIODARONE HYDROCHLORIDE 1 MG/MIN: 50 INJECTION, SOLUTION INTRAVENOUS at 00:25

## 2020-01-01 RX ADMIN — DILTIAZEM HYDROCHLORIDE 20 MG/ML: 5 INJECTION INTRAVENOUS at 07:16

## 2020-01-01 RX ADMIN — SODIUM BICARBONATE: 84 INJECTION INTRAVENOUS at 10:06

## 2020-01-01 RX ADMIN — VANCOMYCIN HYDROCHLORIDE 1000 MG: 10 INJECTION, POWDER, LYOPHILIZED, FOR SOLUTION INTRAVENOUS at 11:50

## 2020-01-01 RX ADMIN — METOPROLOL TARTRATE 2.5 MG: 5 INJECTION, SOLUTION INTRAVENOUS at 16:46

## 2020-01-01 RX ADMIN — PIPERACILLIN SODIUM AND TAZOBACTAM SODIUM 3.38 G: 3; .375 INJECTION, POWDER, LYOPHILIZED, FOR SOLUTION INTRAVENOUS at 15:01

## 2020-01-01 RX ADMIN — INSULIN LISPRO 2 UNITS: 100 INJECTION, SOLUTION INTRAVENOUS; SUBCUTANEOUS at 20:33

## 2020-01-01 RX ADMIN — METOPROLOL TARTRATE 2.5 MG: 5 INJECTION, SOLUTION INTRAVENOUS at 23:30

## 2020-01-01 RX ADMIN — DILTIAZEM HYDROCHLORIDE 20 MG/HR: 5 INJECTION INTRAVENOUS at 18:58

## 2020-01-01 RX ADMIN — LEVALBUTEROL HYDROCHLORIDE 0.63 MG: 0.63 SOLUTION RESPIRATORY (INHALATION) at 01:41

## 2020-01-01 RX ADMIN — METOPROLOL TARTRATE 5 MG: 5 INJECTION INTRAVENOUS at 03:35

## 2020-01-01 RX ADMIN — INSULIN LISPRO 8 UNITS: 100 INJECTION, SOLUTION INTRAVENOUS; SUBCUTANEOUS at 09:04

## 2020-01-01 RX ADMIN — INSULIN LISPRO 4 UNITS: 100 INJECTION, SOLUTION INTRAVENOUS; SUBCUTANEOUS at 13:01

## 2020-01-01 RX ADMIN — SODIUM CHLORIDE, PRESERVATIVE FREE 10 ML: 5 INJECTION INTRAVENOUS at 23:30

## 2020-01-01 RX ADMIN — LEVALBUTEROL HYDROCHLORIDE 0.63 MG: 0.63 SOLUTION RESPIRATORY (INHALATION) at 06:39

## 2020-01-01 RX ADMIN — LEVALBUTEROL HYDROCHLORIDE 0.63 MG: 0.63 SOLUTION RESPIRATORY (INHALATION) at 06:17

## 2020-01-01 RX ADMIN — DILTIAZEM HYDROCHLORIDE 10 MG/HR: 5 INJECTION INTRAVENOUS at 13:00

## 2020-01-01 RX ADMIN — SODIUM CHLORIDE, POTASSIUM CHLORIDE, SODIUM LACTATE AND CALCIUM CHLORIDE: 600; 310; 30; 20 INJECTION, SOLUTION INTRAVENOUS at 10:00

## 2020-01-01 RX ADMIN — SODIUM CHLORIDE, PRESERVATIVE FREE 10 ML: 5 INJECTION INTRAVENOUS at 20:36

## 2020-01-01 RX ADMIN — LEVALBUTEROL HYDROCHLORIDE 0.63 MG: 0.63 SOLUTION RESPIRATORY (INHALATION) at 10:13

## 2020-01-01 RX ADMIN — PIPERACILLIN SODIUM AND TAZOBACTAM SODIUM 3.38 G: 3; .375 INJECTION, POWDER, LYOPHILIZED, FOR SOLUTION INTRAVENOUS at 05:42

## 2020-01-01 RX ADMIN — LEVALBUTEROL HYDROCHLORIDE 0.63 MG: 0.63 SOLUTION RESPIRATORY (INHALATION) at 06:40

## 2020-01-01 RX ADMIN — MORPHINE SULFATE 0.52 MG: 4 INJECTION, SOLUTION INTRAMUSCULAR; INTRAVENOUS at 17:13

## 2020-01-01 RX ADMIN — SODIUM CHLORIDE, POTASSIUM CHLORIDE, SODIUM LACTATE AND CALCIUM CHLORIDE 500 ML: 600; 310; 30; 20 INJECTION, SOLUTION INTRAVENOUS at 06:33

## 2020-01-01 RX ADMIN — LEVALBUTEROL HYDROCHLORIDE 0.63 MG: 0.63 SOLUTION RESPIRATORY (INHALATION) at 01:59

## 2020-01-01 RX ADMIN — DEXTROSE MONOHYDRATE: 50 INJECTION, SOLUTION INTRAVENOUS at 22:54

## 2020-01-01 RX ADMIN — PIPERACILLIN SODIUM AND TAZOBACTAM SODIUM 3.38 G: 3; .375 INJECTION, POWDER, LYOPHILIZED, FOR SOLUTION INTRAVENOUS at 23:29

## 2020-01-01 RX ADMIN — PIPERACILLIN SODIUM AND TAZOBACTAM SODIUM 3.38 G: 3; .375 INJECTION, POWDER, LYOPHILIZED, FOR SOLUTION INTRAVENOUS at 22:19

## 2020-01-01 RX ADMIN — LEVALBUTEROL HYDROCHLORIDE 0.63 MG: 0.63 SOLUTION RESPIRATORY (INHALATION) at 14:14

## 2020-01-01 RX ADMIN — LEVALBUTEROL HYDROCHLORIDE 0.63 MG: 0.63 SOLUTION RESPIRATORY (INHALATION) at 13:49

## 2020-01-01 RX ADMIN — METOPROLOL TARTRATE 2.5 MG: 5 INJECTION, SOLUTION INTRAVENOUS at 20:55

## 2020-01-01 RX ADMIN — LEVALBUTEROL HYDROCHLORIDE 0.63 MG: 0.63 SOLUTION RESPIRATORY (INHALATION) at 22:45

## 2020-01-01 RX ADMIN — LEVALBUTEROL HYDROCHLORIDE 0.63 MG: 0.63 SOLUTION RESPIRATORY (INHALATION) at 22:30

## 2020-01-01 RX ADMIN — SODIUM BICARBONATE: 84 INJECTION INTRAVENOUS at 01:15

## 2020-01-01 RX ADMIN — METOPROLOL TARTRATE 2.5 MG: 5 INJECTION, SOLUTION INTRAVENOUS at 11:49

## 2020-01-01 RX ADMIN — METOPROLOL TARTRATE 2.5 MG: 5 INJECTION, SOLUTION INTRAVENOUS at 03:35

## 2020-01-01 RX ADMIN — AMIODARONE HYDROCHLORIDE 1 MG/MIN: 50 INJECTION, SOLUTION INTRAVENOUS at 00:10

## 2020-01-01 RX ADMIN — PIPERACILLIN SODIUM AND TAZOBACTAM SODIUM 3.38 G: 3; .375 INJECTION, POWDER, LYOPHILIZED, FOR SOLUTION INTRAVENOUS at 15:06

## 2020-01-01 RX ADMIN — INSULIN GLARGINE 15 UNITS: 100 INJECTION, SOLUTION SUBCUTANEOUS at 20:35

## 2020-01-01 RX ADMIN — METOPROLOL TARTRATE 2.5 MG: 5 INJECTION, SOLUTION INTRAVENOUS at 04:45

## 2020-01-01 RX ADMIN — LEVALBUTEROL HYDROCHLORIDE 0.63 MG: 0.63 SOLUTION RESPIRATORY (INHALATION) at 22:36

## 2020-01-01 RX ADMIN — HEPARIN SODIUM 12 UNITS/KG/HR: 10000 INJECTION, SOLUTION INTRAVENOUS at 15:02

## 2020-01-01 RX ADMIN — LEVALBUTEROL HYDROCHLORIDE 0.63 MG: 0.63 SOLUTION RESPIRATORY (INHALATION) at 10:24

## 2020-01-01 RX ADMIN — PIPERACILLIN SODIUM AND TAZOBACTAM SODIUM 3.38 G: 3; .375 INJECTION, POWDER, LYOPHILIZED, FOR SOLUTION INTRAVENOUS at 04:45

## 2020-01-01 RX ADMIN — AMIODARONE HYDROCHLORIDE 0.5 MG/MIN: 50 INJECTION, SOLUTION INTRAVENOUS at 01:41

## 2020-01-01 RX ADMIN — PIPERACILLIN SODIUM AND TAZOBACTAM SODIUM 3.38 G: 3; .375 INJECTION, POWDER, LYOPHILIZED, FOR SOLUTION INTRAVENOUS at 05:49

## 2020-01-01 RX ADMIN — INSULIN LISPRO 2 UNITS: 100 INJECTION, SOLUTION INTRAVENOUS; SUBCUTANEOUS at 20:35

## 2020-01-01 RX ADMIN — METOPROLOL TARTRATE 2.5 MG: 5 INJECTION, SOLUTION INTRAVENOUS at 22:50

## 2020-01-01 RX ADMIN — METOPROLOL TARTRATE 2.5 MG: 5 INJECTION, SOLUTION INTRAVENOUS at 10:21

## 2020-01-01 RX ADMIN — ACETAMINOPHEN 650 MG: 650 SUPPOSITORY RECTAL at 16:54

## 2020-01-01 RX ADMIN — METOPROLOL TARTRATE 2.5 MG: 5 INJECTION, SOLUTION INTRAVENOUS at 23:05

## 2020-01-01 RX ADMIN — LEVALBUTEROL HYDROCHLORIDE 0.63 MG: 0.63 SOLUTION RESPIRATORY (INHALATION) at 10:41

## 2020-01-01 RX ADMIN — METOPROLOL TARTRATE 2.5 MG: 5 INJECTION, SOLUTION INTRAVENOUS at 10:35

## 2020-01-01 RX ADMIN — DILTIAZEM HYDROCHLORIDE 10 MG/HR: 5 INJECTION INTRAVENOUS at 00:30

## 2020-01-01 RX ADMIN — HEPARIN SODIUM 8 UNITS/HR: 10000 INJECTION, SOLUTION INTRAVENOUS at 05:59

## 2020-01-01 RX ADMIN — POTASSIUM CHLORIDE 10 MEQ: 7.46 INJECTION, SOLUTION INTRAVENOUS at 05:53

## 2020-01-01 RX ADMIN — SODIUM CHLORIDE, PRESERVATIVE FREE 10 ML: 5 INJECTION INTRAVENOUS at 09:01

## 2020-01-01 RX ADMIN — INSULIN LISPRO 6 UNITS: 100 INJECTION, SOLUTION INTRAVENOUS; SUBCUTANEOUS at 08:05

## 2020-01-01 RX ADMIN — DILTIAZEM HYDROCHLORIDE 15 MG/HR: 5 INJECTION INTRAVENOUS at 14:33

## 2020-01-01 RX ADMIN — INSULIN LISPRO 4 UNITS: 100 INJECTION, SOLUTION INTRAVENOUS; SUBCUTANEOUS at 16:51

## 2020-01-01 RX ADMIN — MAGNESIUM SULFATE HEPTAHYDRATE 1 G: 1 INJECTION, SOLUTION INTRAVENOUS at 05:53

## 2020-01-01 RX ADMIN — INSULIN GLARGINE 15 UNITS: 100 INJECTION, SOLUTION SUBCUTANEOUS at 20:33

## 2020-01-01 RX ADMIN — PIPERACILLIN SODIUM AND TAZOBACTAM SODIUM 3.38 G: 3; .375 INJECTION, POWDER, LYOPHILIZED, FOR SOLUTION INTRAVENOUS at 22:50

## 2020-01-01 RX ADMIN — DILTIAZEM HYDROCHLORIDE 10 MG: 5 INJECTION INTRAVENOUS at 12:50

## 2020-01-01 RX ADMIN — DIGOXIN 125 MCG: 0.25 INJECTION INTRAMUSCULAR; INTRAVENOUS at 10:21

## 2020-01-01 RX ADMIN — METOPROLOL TARTRATE 2.5 MG: 5 INJECTION INTRAVENOUS at 03:42

## 2020-01-01 RX ADMIN — LEVALBUTEROL HYDROCHLORIDE 0.63 MG: 0.63 SOLUTION RESPIRATORY (INHALATION) at 10:51

## 2020-01-01 RX ADMIN — LEVALBUTEROL HYDROCHLORIDE 0.63 MG: 0.63 SOLUTION RESPIRATORY (INHALATION) at 14:22

## 2020-01-01 RX ADMIN — AMIODARONE HYDROCHLORIDE 1 MG/MIN: 50 INJECTION, SOLUTION INTRAVENOUS at 08:20

## 2020-01-01 RX ADMIN — POTASSIUM CHLORIDE 10 MEQ: 7.46 INJECTION, SOLUTION INTRAVENOUS at 07:54

## 2020-01-01 RX ADMIN — LEVALBUTEROL HYDROCHLORIDE 0.63 MG: 0.63 SOLUTION RESPIRATORY (INHALATION) at 18:51

## 2020-01-01 RX ADMIN — DIGOXIN 125 MCG: 0.25 INJECTION INTRAMUSCULAR; INTRAVENOUS at 10:35

## 2020-01-01 RX ADMIN — PIPERACILLIN SODIUM AND TAZOBACTAM SODIUM 3.38 G: 3; .375 INJECTION, POWDER, LYOPHILIZED, FOR SOLUTION INTRAVENOUS at 05:36

## 2020-01-01 RX ADMIN — DEXTROSE MONOHYDRATE: 50 INJECTION, SOLUTION INTRAVENOUS at 11:45

## 2020-01-01 RX ADMIN — METOPROLOL TARTRATE 2.5 MG: 5 INJECTION, SOLUTION INTRAVENOUS at 03:42

## 2020-01-01 RX ADMIN — DEXTROSE MONOHYDRATE: 50 INJECTION, SOLUTION INTRAVENOUS at 22:50

## 2020-01-01 RX ADMIN — AMIODARONE HYDROCHLORIDE 0.5 MG/MIN: 50 INJECTION, SOLUTION INTRAVENOUS at 10:29

## 2020-01-01 RX ADMIN — DEXTROSE MONOHYDRATE: 50 INJECTION, SOLUTION INTRAVENOUS at 22:20

## 2020-01-01 RX ADMIN — DIGOXIN 500 MCG: 0.25 INJECTION INTRAMUSCULAR; INTRAVENOUS at 05:24

## 2020-01-01 RX ADMIN — HEPARIN SODIUM 5000 UNITS: 5000 INJECTION INTRAVENOUS; SUBCUTANEOUS at 01:19

## 2020-01-01 RX ADMIN — SODIUM CHLORIDE, PRESERVATIVE FREE 10 ML: 5 INJECTION INTRAVENOUS at 00:36

## 2020-01-01 RX ADMIN — METOPROLOL TARTRATE 2.5 MG: 5 INJECTION, SOLUTION INTRAVENOUS at 17:39

## 2020-01-01 RX ADMIN — SODIUM CHLORIDE, PRESERVATIVE FREE 10 ML: 5 INJECTION INTRAVENOUS at 20:35

## 2020-01-01 RX ADMIN — HEPARIN SODIUM 5000 UNITS: 5000 INJECTION INTRAVENOUS; SUBCUTANEOUS at 05:36

## 2020-01-01 RX ADMIN — MAGNESIUM SULFATE HEPTAHYDRATE 1 G: 1 INJECTION, SOLUTION INTRAVENOUS at 06:58

## 2020-01-01 RX ADMIN — VANCOMYCIN HYDROCHLORIDE 1000 MG: 10 INJECTION, POWDER, LYOPHILIZED, FOR SOLUTION INTRAVENOUS at 23:56

## 2020-01-01 RX ADMIN — LEVALBUTEROL HYDROCHLORIDE 0.63 MG: 0.63 SOLUTION RESPIRATORY (INHALATION) at 19:29

## 2020-01-01 RX ADMIN — METOPROLOL TARTRATE 2.5 MG: 5 INJECTION, SOLUTION INTRAVENOUS at 15:56

## 2020-01-01 RX ADMIN — SODIUM CHLORIDE, PRESERVATIVE FREE 10 ML: 5 INJECTION INTRAVENOUS at 20:55

## 2020-01-01 RX ADMIN — LEVALBUTEROL HYDROCHLORIDE 0.63 MG: 0.63 SOLUTION RESPIRATORY (INHALATION) at 18:23

## 2020-01-01 RX ADMIN — DEXTROSE MONOHYDRATE: 50 INJECTION, SOLUTION INTRAVENOUS at 13:52

## 2020-01-01 RX ADMIN — METOPROLOL TARTRATE 2.5 MG: 5 INJECTION, SOLUTION INTRAVENOUS at 09:15

## 2020-01-01 RX ADMIN — PIPERACILLIN SODIUM AND TAZOBACTAM SODIUM 3.38 G: 3; .375 INJECTION, POWDER, LYOPHILIZED, FOR SOLUTION INTRAVENOUS at 14:33

## 2020-01-01 RX ADMIN — METOPROLOL TARTRATE 2.5 MG: 5 INJECTION, SOLUTION INTRAVENOUS at 17:55

## 2020-01-01 RX ADMIN — METOPROLOL TARTRATE 5 MG: 5 INJECTION, SOLUTION INTRAVENOUS at 03:35

## 2020-01-01 RX ADMIN — METOPROLOL TARTRATE 2.5 MG: 5 INJECTION, SOLUTION INTRAVENOUS at 04:08

## 2020-01-01 RX ADMIN — METOPROLOL TARTRATE 2.5 MG: 5 INJECTION, SOLUTION INTRAVENOUS at 04:30

## 2020-01-01 RX ADMIN — LEVALBUTEROL HYDROCHLORIDE 0.63 MG: 0.63 SOLUTION RESPIRATORY (INHALATION) at 02:24

## 2020-01-01 RX ADMIN — METOPROLOL TARTRATE 2.5 MG: 5 INJECTION, SOLUTION INTRAVENOUS at 03:46

## 2020-01-01 RX ADMIN — POTASSIUM CHLORIDE 10 MEQ: 7.46 INJECTION, SOLUTION INTRAVENOUS at 08:52

## 2020-01-01 RX ADMIN — PIPERACILLIN SODIUM AND TAZOBACTAM SODIUM 3.38 G: 3; .375 INJECTION, POWDER, LYOPHILIZED, FOR SOLUTION INTRAVENOUS at 05:56

## 2020-01-01 RX ADMIN — PIPERACILLIN SODIUM AND TAZOBACTAM SODIUM 3.38 G: 3; .375 INJECTION, POWDER, LYOPHILIZED, FOR SOLUTION INTRAVENOUS at 14:56

## 2020-01-01 RX ADMIN — INSULIN LISPRO 6 UNITS: 100 INJECTION, SOLUTION INTRAVENOUS; SUBCUTANEOUS at 08:57

## 2020-01-01 RX ADMIN — VANCOMYCIN HYDROCHLORIDE 1000 MG: 10 INJECTION, POWDER, LYOPHILIZED, FOR SOLUTION INTRAVENOUS at 04:04

## 2020-01-01 RX ADMIN — DEXTROSE MONOHYDRATE: 50 INJECTION, SOLUTION INTRAVENOUS at 08:52

## 2020-01-01 ASSESSMENT — ENCOUNTER SYMPTOMS
EYE REDNESS: 0
COUGH: 0
EYE REDNESS: 0
DIARRHEA: 0
COUGH: 0
BLOOD IN STOOL: 0
DIARRHEA: 0
SORE THROAT: 0
CONSTIPATION: 0
VOMITING: 0
SHORTNESS OF BREATH: 0
ABDOMINAL PAIN: 0
CONSTIPATION: 0
WHEEZING: 0
SORE THROAT: 0
EYE DISCHARGE: 0
RHINORRHEA: 0
CHOKING: 0
RHINORRHEA: 0

## 2020-01-01 ASSESSMENT — PAIN SCALES - GENERAL
PAINLEVEL_OUTOF10: 0
PAINLEVEL_OUTOF10: 6
PAINLEVEL_OUTOF10: 4
PAINLEVEL_OUTOF10: 0

## 2020-01-01 ASSESSMENT — PAIN SCALES - WONG BAKER: WONGBAKER_NUMERICALRESPONSE: 0

## 2020-01-14 NOTE — PROGRESS NOTES
Aniceto Ian Diaz  Phone [phone]   Fax [phone]      OFFICE VISIT: 2020    Dayami Price- : 1935    Chief Complaint:Ty is a 82 y.o. male who is here for Discuss Medications (patch for dementia)     HPI  The patient presents today to discuss dementia. He has been taking Namenda 10 mg BID. He could not tolerate Aricept due to diarrhea. His wife is with him most of the time. His wife reports increased \"episodes\" of not knowing where he is at. He can no longer remember how to make coffee. His wife is interested in trying another medication for his dementia. He does not typically check his blood sugars. He is on metformin 500 mg BID & Tradjenta 5 mg daily. Denies any known blood sugar lows. His BP is elevated today. He has been taking Avapro 300 mg & Toprol 100 mg daily. height is 5' 11\" (1.803 m) and weight is 154 lb (69.9 kg). His temporal temperature is 98.7 °F (37.1 °C). His blood pressure is 148/82 (abnormal) and his pulse is 72. His oxygen saturation is 98%. Body mass index is 21.48 kg/m². Results for orders placed or performed during the hospital encounter of 10/01/19   NM MYOCARDIAL SPECT REST EXERCISE OR RX   Result Value Ref Range    Left Ventricular Ejection Fraction 57     LVEF MODALITY Nuclear      have reviewed the following with the Mr. Cain [address] Outpatient Visit on 10/01/2019   Component Date Value    Left Ventricular Ejectio* 10/01/2019 57     LVEF MODALITY 10/01/2019 Nuclear    Orders Only on 2019   Component Date Value    Cholesterol, Total 2019 132*    Triglycerides 2019 169*    HDL 2019 37*    LDL Calculated 2019 61     Hemoglobin A1C 2019 6.1*    WBC 2019 7.8     RBC 2019 4.74     Hemoglobin 2019 15.7     Hematocrit 2019 48.4     MCV 2019 102.1*    MCH 2019 33.1*    MCHC 2019 32.4*    RDW 2019 12.1     tablet Take 1 tablet by mouth every morning (before breakfast) Yes J LUIS Everett   blood glucose test strips (TRUE METRIX BLOOD GLUCOSE TEST) strip 1 each by Does not apply route daily Yes J LUIS Rodriguez   aspirin EC 81 MG EC tablet Take 1 tablet by mouth daily Yes J LUIS Leone   vitamin B-12 (CYANOCOBALAMIN) 500 MCG tablet Take 500 mcg by mouth daily Yes Historical Provider, MD   Glucosamine-Chondroitin (GLUCOSAMINE CHONDR COMPLEX PO) Take 1 tablet by mouth daily Yes Historical Provider, MD   Multiple Vitamins-Minerals (THERAPEUTIC MULTIVITAMIN-MINERALS) tablet Take 1 tablet by mouth daily. Yes Historical Provider, MD   linagliptin (TRADJENTA) 5 MG tablet Take 1 tablet by mouth daily  J LUIS Rodriguez       Allergies: Patient has no known allergies. Past Medical History:   Diagnosis Date    CAD (coronary artery disease)     COPD (chronic obstructive pulmonary disease) (HCC)     Hyperlipidemia     Hypertension     Type II or unspecified type diabetes mellitus without mention of complication, not stated as uncontrolled        Past Surgical History:   Procedure Laterality Date    CATARACT REMOVAL      CORONARY ARTERY BYPASS GRAFT  2010    3V by Dr. Annabel Hamilton at [address] [place] [address],[address]      from face    HERNIA REPAIR      KIDNEY STONE SURGERY      TYMPANOSTOMY TUBE PLACEMENT         Social History     Tobacco Use    Smoking status: Former Smoker    Smokeless tobacco: Never Used   Substance Use Topics    Alcohol use: No       Family History   Problem Relation Age of Onset    Diabetes Father     Heart Disease Father     Cancer Brother        Review of Systems   Constitutional: Positive for fatigue. Negative for chills and fever. HENT: Negative for congestion, ear pain, rhinorrhea and sore throat. Eyes: Negative for redness. Respiratory: Negative for cough and shortness of breath.     Cardiovascular: Negative for chest pain, palpitations and leg swelling. Gastrointestinal: Negative for abdominal pain, constipation, diarrhea and vomiting. Skin: Negative for rash. Neurological: Negative for dizziness and headaches. Dementia  Memory loss       Physical Exam  Vitals signs reviewed. Constitutional:       Appearance: He is well-developed. HENT:      Head: Normocephalic. Right Ear: Hearing and external ear normal.      Left Ear: Hearing and external ear normal.      Nose: Nose normal.   Neck:      Musculoskeletal: Normal range of motion. Cardiovascular:      Rate and Rhythm: Normal rate and regular rhythm. Pulmonary:      Effort: Pulmonary effort is normal.      Breath sounds: Normal breath sounds. No wheezing, rhonchi or rales. Abdominal:      General: Bowel sounds are normal.      Palpations: Abdomen is soft. Lymphadenopathy:      Cervical: No cervical adenopathy. Skin:     General: Skin is dry. Neurological:      Mental Status: He is alert. Psychiatric:         Mood and Affect: Mood normal.         Behavior: Behavior normal.         Thought Content: Thought content normal.         Cognition and Memory: Memory is impaired. Judgment: Judgment normal.       ASSESSMENT      ICD-10-CM    1. Dementia without behavioral disturbance (HCC) F03.90 rivastigmine (EXELON) 4.6 MG/24HR  Continue Namenda 10 mg BID   2. Essential hypertension I10 The current medical regimen is effective;  continue present plan and medications. Patient is asked to monitor BP at home or work, several times per month and return with written values at next office visit. PLAN    No orders of the defined types were placed in this encounter. Return in about 6 weeks (around 2/25/2020), or if symptoms worsen or fail to improve. Patient Instructions       Patient Education        Helping a Person With Alzheimer's Disease: Care Instructions  Your Care Instructions    Alzheimer's disease is a type of dementia.  It affects memory, intelligence, judgment, language, and behavior. It is not clear what causes this disease. But it is the most common form of dementia in older adults. It may take many years to develop. Alzheimer's disease is different than mild memory loss that occurs with aging. Family members usually notice symptoms first. But the person also may realize that something is wrong. Follow-up care is a key part of your loved one's treatment and safety. Be sure to make and go to all appointments, and call your doctor if your loved one is having problems. It's also a good idea to know your loved one's test results and keep a list of the medicines he or she takes. How can you care for your loved one at home? · Develop a routine. The person will feel less frustrated or confused with a clear, simple daily plan. Remind him or her about important facts and events. · Be patient. It may take longer for the person to complete a task than it used to. · Help the person eat a balanced diet. Serve plenty of whole grains, fruits, and vegetables every day. If the person is not eating well at mealtimes, give snacks at midmorning and in the afternoon. Offer drinks such as Boost, Ensure, or Sustacal if he or she is losing weight. · Encourage exercise. Walking and other activity may slow the decline of mental ability. Help the person keep an active mind. Encourage hobbies such as reading and crossword puzzles. · Take steps to help if the person is sundowning. This is the restless behavior and trouble with sleeping that may occur in late afternoon and at night. Try not to let the person nap during the day. Offer a glass of warm milk or caffeine-free tea before bedtime. · Ask family members and friends for help. You may need breaks where others can help care for the person. · Talk to the person's doctor about what resources are available for help in your area. · Review all of the person's medicines with his or her doctor.   · For as long as the person is able, allow him or her to make decisions about activities, food, clothing, and other choices. Let the person be independent, even if tasks take more time or are not done perfectly. Tailor tasks to the person's abilities. For example, if cooking is no longer safe, ask for other help. He or she can help set the table or make simple dishes such as a salad. When the person needs help, offer it gently. Keeping safe  · Make your home (or the person's home) safe. Tack down rugs, and put no-slip tape in the tub. Install handrails, and put safety switches on stoves and appliances. Keep rooms free of clutter. Make sure walkways around furniture are clear. Do not move furniture around, because the person may become confused. · Use locks on doors and cupboards. Lock up knives, scissors, medicines, cleaning supplies, and other dangerous things. · Do not let the person drive or cook if he or she cannot do it safely. A person with Alzheimer's should not drive unless he or she is able to pass an on-road driving test. Your state 's license bureau can do a driving test if there is any question. · Get medical alert jewelry for the person so you can be contacted if he or she wanders away. If possible, provide a safe place for wandering, such as an enclosed yard or garden. When should you call for help? Call 911 anytime you think you may need emergency care.  For example, call if:    · A person who has Alzheimer's disease has disappeared.     · A person who has Alzheimer's disease is seriously injured.   [facility] your doctor now or seek immediate medical care if:    · The person you are caring for suddenly sees or hears things that are not there (hallucinates).     · The person you are caring for has a sudden, drastic change in his or her behavior.    Watch closely for changes in your loved one's health, and be sure to contact the doctor if:    · A person who has Alzheimer's disease gradually gets worse or has symptoms that with them. Once those changes become habit, add a few more changes. · Try some of the following:  ? Make it a goal to eat a fruit or vegetable at every meal and at snacks. This will make it easy to get the recommended amount of fruits and vegetables each day. ? Try yogurt topped with fruit and nuts for a snack or healthy dessert. ? Add lettuce, tomato, cucumber, and onion to sandwiches. ? Combine a ready-made pizza crust with low-fat mozzarella cheese and lots of vegetable toppings. Try using tomatoes, squash, spinach, broccoli, carrots, cauliflower, and onions. ? Have a variety of cut-up vegetables with a low-fat dip as an appetizer instead of chips and dip. ? Sprinkle sunflower seeds or chopped almonds over salads. Or try adding chopped walnuts or almonds to cooked vegetables. ? Try some vegetarian meals using beans and peas. Add garbanzo or kidney beans to salads. Make burritos and tacos with mashed mullen beans or black beans. Where can you learn more? Go to [email or web address] org and sign in to your [employer] account. Enter I085 in the [facility] box to learn more about \"DASH Diet: Care Instructions. \"     If you do not have an account, please click on the \"Sign Up Now\" link. Current as of: April 9, 2019  Content Version: 12.3  © 5218-7673 Healthwise, Incorporated. Care instructions adapted under license by [facility] ([facility]). If you have questions about a medical condition or this instruction, always ask your healthcare professional. [place] [address] any warranty or liability for your use of this information.                        Controlled Substances Monitoring:  n/a            Additional Instructions: As always, patient is advised to bring in medication bottles in order to correctly reconcile with our current list.    Niko Ugarte received counseling on the following healthy behaviors: n/a    Patient given educational materials on dx    I have instructed Niko Ugarte to complete a self tracking handout on n/a and instructed them to bring it with them to his next appointment. Discussed use, benefit, and side effects of prescribed medications. Barriers to medication compliance addressed. All patient questions answered. Pt voiced understanding.      J LUIS Dover

## 2020-01-14 NOTE — PATIENT INSTRUCTIONS
behavior.    Watch closely for changes in your loved one's health, and be sure to contact the doctor if:    · A person who has Alzheimer's disease gradually gets worse or has symptoms that could cause injury.     · You need help caring for a person with Alzheimer's disease.     · The person has problems with his or her medicine. Where can you learn more? Go to [email or web address] and sign in to your [employer] account. Enter R296 in the [employer] box to learn more about \"Helping a Person With Alzheimer's Disease: Care Instructions. \"     If you do not have an account, please click on the \"Sign Up Now\" link. Current as of: May 28, 2019  Content Version: 12.3  © 1314-8340 Healthwise, [employer]. Care instructions adapted under license by [facility] ([facility]). If you have questions about a medical condition or this instruction, always ask your healthcare professional. [place] [address] any warranty or liability for your use of this information. Patient Education        DASH Diet: Care Instructions  Your Care Instructions    The DASH diet is an eating plan that can help lower your blood pressure. DASH stands for Dietary Approaches to Stop Hypertension. Hypertension is high blood pressure. The DASH diet focuses on eating foods that are high in calcium, potassium, and magnesium. These nutrients can lower blood pressure. The foods that are highest in these nutrients are fruits, vegetables, low-fat dairy products, nuts, seeds, and legumes. But taking calcium, potassium, and magnesium supplements instead of eating foods that are high in those nutrients does not have the same effect. The DASH diet also includes whole grains, fish, and poultry. The DASH diet is one of several lifestyle changes your doctor may recommend to lower your high blood pressure. Your doctor may also want you to decrease the amount of sodium in your diet.  Lowering sodium while following the DASH

## 2020-02-11 NOTE — PROGRESS NOTES
90 tablet 2    irbesartan (AVAPRO) 300 MG tablet Take 1 tablet by mouth daily 90 tablet 2    metoprolol succinate (TOPROL XL) 100 MG extended release tablet Take 1 tablet by mouth daily Do not crush or chew. 90 tablet 3    levocetirizine (XYZAL) 5 MG tablet Take 1 tablet by mouth nightly 90 tablet 3    metFORMIN (GLUCOPHAGE) 500 MG tablet Take 1 tablet by mouth 2 times daily (with meals) 180 tablet 3    linagliptin (TRADJENTA) 5 MG tablet Take 1 tablet by mouth daily 30 tablet 11    atorvastatin (LIPITOR) 40 MG tablet Take 1 tablet by mouth nightly 90 tablet 3    pantoprazole (PROTONIX) 40 MG tablet Take 1 tablet by mouth every morning (before breakfast) 30 tablet 5    blood glucose test strips (TRUE METRIX BLOOD GLUCOSE TEST) strip 1 each by Does not apply route daily 100 strip 3    aspirin EC 81 MG EC tablet Take 1 tablet by mouth daily 30 tablet 3    vitamin B-12 (CYANOCOBALAMIN) 500 MCG tablet Take 500 mcg by mouth daily      Glucosamine-Chondroitin (GLUCOSAMINE CHONDR COMPLEX PO) Take 1 tablet by mouth daily      Multiple Vitamins-Minerals (THERAPEUTIC MULTIVITAMIN-MINERALS) tablet Take 1 tablet by mouth daily. No current facility-administered medications for this visit. No Known Allergies       Health Maintenance   Topic Date Due    DTaP/Tdap/Td vaccine (1 - Tdap) 05/23/1946    Hepatitis B vaccine (1 of 3 - Risk 3-dose series) 05/23/1956    Shingles Vaccine (1 of 2) 05/23/1987    Annual Wellness Visit (AWV)  10/08/2019    Lipid screen  09/04/2020    Potassium monitoring  09/04/2020    Creatinine monitoring  09/04/2020    Flu vaccine  Completed    Pneumococcal 65+ years Vaccine  Completed    Hepatitis A vaccine  Aged Out    Hib vaccine  Aged Out    Meningococcal (ACWY) vaccine  Aged Out        Subjective:     Review of Systems   Constitutional: Negative for appetite change and unexpected weight change. HENT: Negative for congestion, ear pain, rhinorrhea and sore throat. Differential     CANCELED: Comprehensive Metabolic Panel     CANCELED: Hemoglobin A1C     CANCELED: TSH without Reflex     CANCELED: Vitamin D 25 Hydroxy   3. Fall, sequela W19. XXXS    4. Vitamin D deficiency E55.9 CANCELED: Vitamin D 25 Hydroxy   5. Altered mental status, unspecified altered mental status type R41.82 POCT Urinalysis no Micro     Urine Culture       Plan:   Orthostatic blood pressures are normal.  UA is normal will send for culture to confirm. Will check lab work. Patient's family is really wanting him admitted to the hospital for 3 days days so that he can be admitted to SNF. Due to his decline in mental status changes will have them go to [facility] ER for further evaluation tonight. No follow-ups on file. Orders Placed This Encounter   Procedures    Urine Culture     Standing Status:   Future     Number of Occurrences:   1     Standing Expiration Date:   2/11/2021     Order Specific Question:   Specify (ex-cath, midstream, cysto, etc)? Answer:   midstream    POCT Urinalysis no Micro     No orders of the defined types were placed in this encounter. Discussed use, benefit, and side effectsof prescribed medications. All patient questions answered. Pt voiced understanding. Reviewed health maintenance. .  Patient agreed with treatment plan. Follow up asdirected. There are no Patient Instructions on file for this visit.       Electronically signed by J LUIS Franz on2/13/2020 at 4:29 PM

## 2020-02-11 NOTE — TELEPHONE ENCOUNTER
Reason for Disposition   [1] Can't control passage of urine (i.e., urinary incontinence) AND [2] new onset (< 2 weeks) or worsening    Protocols used: URINARY SYMPTOMS-ADULT-AH    Received call from Magda in [facility]. Wife calling for patient who is on the room with patient. He has dementia. Wife just recently got out of the hospital herself and is feeling weak. Patient has been having urinary incontinence for over a week, off and on, but not always. The urine does have an odor to it. Denies fever, abdominal pain or nausea. No back pain. He also had dizziness yesterday and apparently fell. Wife did not witness it and has found any injury. Patient denies injury or hitting head. He is on Plavix. Call soft transferred to [facility] to schedule appointment.

## 2020-02-26 ENCOUNTER — LAB REQUISITION (OUTPATIENT)
Dept: LAB | Facility: HOSPITAL | Age: 85
End: 2020-02-26

## 2020-02-26 DIAGNOSIS — Z00.00 ROUTINE GENERAL MEDICAL EXAMINATION AT A HEALTH CARE FACILITY: ICD-10-CM

## 2020-02-26 LAB
BASOPHILS # BLD AUTO: 0.07 10*3/MM3 (ref 0–0.2)
BASOPHILS NFR BLD AUTO: 0.6 % (ref 0–1.5)
DEPRECATED RDW RBC AUTO: 46.2 FL (ref 37–54)
EOSINOPHIL # BLD AUTO: 0.01 10*3/MM3 (ref 0–0.4)
EOSINOPHIL NFR BLD AUTO: 0.1 % (ref 0.3–6.2)
ERYTHROCYTE [DISTWIDTH] IN BLOOD BY AUTOMATED COUNT: 12.6 % (ref 12.3–15.4)
HCT VFR BLD AUTO: 60 % (ref 37.5–51)
HGB BLD-MCNC: 19.6 G/DL (ref 13–17.7)
IMM GRANULOCYTES # BLD AUTO: 0.03 10*3/MM3 (ref 0–0.05)
IMM GRANULOCYTES NFR BLD AUTO: 0.3 % (ref 0–0.5)
LYMPHOCYTES # BLD AUTO: 1.17 10*3/MM3 (ref 0.7–3.1)
LYMPHOCYTES NFR BLD AUTO: 10.1 % (ref 19.6–45.3)
MCH RBC QN AUTO: 32.1 PG (ref 26.6–33)
MCHC RBC AUTO-ENTMCNC: 32.7 G/DL (ref 31.5–35.7)
MCV RBC AUTO: 98.2 FL (ref 79–97)
MONOCYTES # BLD AUTO: 0.86 10*3/MM3 (ref 0.1–0.9)
MONOCYTES NFR BLD AUTO: 7.5 % (ref 5–12)
NEUTROPHILS # BLD AUTO: 9.39 10*3/MM3 (ref 1.7–7)
NEUTROPHILS NFR BLD AUTO: 81.4 % (ref 42.7–76)
NRBC BLD AUTO-RTO: 0 /100 WBC (ref 0–0.2)
PLATELET # BLD AUTO: 225 10*3/MM3 (ref 140–450)
PMV BLD AUTO: 11.3 FL (ref 6–12)
RBC # BLD AUTO: 6.11 10*6/MM3 (ref 4.14–5.8)
WBC NRBC COR # BLD: 11.53 10*3/MM3 (ref 3.4–10.8)

## 2020-02-26 PROCEDURE — 85025 COMPLETE CBC W/AUTO DIFF WBC: CPT

## 2020-02-27 ENCOUNTER — LAB REQUISITION (OUTPATIENT)
Dept: LAB | Facility: HOSPITAL | Age: 85
End: 2020-02-27

## 2020-02-27 DIAGNOSIS — Z00.00 ROUTINE GENERAL MEDICAL EXAMINATION AT A HEALTH CARE FACILITY: ICD-10-CM

## 2020-02-28 PROBLEM — J18.1 LEFT LOWER LOBE CONSOLIDATION (HCC): Status: ACTIVE | Noted: 2020-01-01

## 2020-02-28 NOTE — LETTER
included under [facility] Advanced. A Clinical Episode is a grouping of medical conditions or diagnoses that are included in the [address] [facility].

## 2020-02-29 PROBLEM — E43 SEVERE MALNUTRITION (HCC): Status: ACTIVE | Noted: 2020-01-01

## 2020-02-29 NOTE — PROGRESS NOTES
Noted rhonchi in patient's upper airway. He attempts to clear his throat but is too weak to clear secretions. OT suctioned patient with return of large amount of thick, yellow secretions. Airway clear post suctioning.

## 2020-02-29 NOTE — PROGRESS NOTES
Assessment completed. Repositioned patient in the bed. Dr. Iqra Villegas regarding consult per nightshift.

## 2020-02-29 NOTE — PROGRESS NOTES
tamiflu   Objective:   VITALS:  BP (!) 147/90   Pulse 129   Temp 96.8 °F (36 °C) (Temporal)   Resp 29   Ht 6' (1.829 m)   Wt 133 lb 14.4 oz (60.7 kg)   SpO2 90%   BMI 18.16 kg/m²   24HR INTAKE/OUTPUT:      Intake/Output Summary (Last 24 hours) at 2/29/2020 0813  Last data filed at 2/29/2020 0547  Gross per 24 hour   Intake 602.65 ml   Output 150 ml   Net 452.65 ml      General appearance: lethargic   HEENT: atraumatic, eyes with clear conjunctiva and normal lids  Lungs:  increased work of breathing, diminished breath sounds bilaterally  Heart: S1, S2 normal  Abdomen: soft, non-tender; bowel sounds normal; no masses,  no organomegaly  Extremities:atraumatic, no cyanosis no edema no calf tenderness   Neurologic:lethargic   Skin: no rashes, nodules.           Medications:      sodium bicarbonate infusion 100 mL/hr at 02/29/20 0300    amiodarone      dextrose        oseltamivir 6mg/ml  30 mg Oral Daily    metoprolol  2.5 mg Intravenous Q6H    piperacillin-tazobactam  3.375 g Intravenous Q8H    aspirin EC  81 mg Oral Daily    atorvastatin  40 mg Oral Nightly    clopidogrel  75 mg Oral Daily    therapeutic multivitamin-minerals  1 tablet Oral Daily    pantoprazole  40 mg Oral QAM AC    vitamin B-12  500 mcg Oral Daily    insulin glargine  15 Units Subcutaneous Nightly    sodium chloride flush  10 mL Intravenous 2 times per day    heparin (porcine)  5,000 Units Subcutaneous 3 times per day     glucose, dextrose, glucagon (rDNA), dextrose, sodium chloride flush, polyethylene glycol, melatonin, ondansetron **OR** ondansetron  Diet NPO Effective Now         Lab and other Data:     Recent Labs     02/29/20  0138   WBC 14.2*   HGB 18.0        Recent Labs     02/28/20  2321 02/29/20  0138   * 152*   K 4.1 4.1   * 116*   CO2 18* 17*   BUN 41* 42*   CREATININE 2.3* 2.1*   GLUCOSE 349* 353*     Recent Labs     02/28/20 2321   AST 35   ALT 18   BILITOT 0.8   ALKPHOS 90     Troponin T: Recent Labs     02/28/20  2321 02/29/20  0138 02/29/20  0503   TROPONINI 0.02 0.01 <0.01     Pro-BNP: No results for input(s): BNP in the last 72 hours. INR:   Recent Labs     02/28/20  2321   INR 1.53*     ABGs: No results found for: PHART, PO2ART, HVW6HSS  UA:  Recent Labs     02/29/20  0400   COLORU DK YELLOW   PHUR 5.5   LABCAST 0-1 RBC*   RBCUA 3-5*   CLARITYU CLOUDY*   SPECGRAV 1.028   LEUKOCYTESUR Negative   UROBILINOGEN 1.0   BILIRUBINUR SMALL*   BLOODU LARGE*   GLUCOSEU 500*       Imaging:   No orders to display     Micro: No results found for: BC, No components found for: LABURINE  Patient Active Problem List    Diagnosis Date Noted    Left lower lobe consolidation ([facility] 02/28/2020    Nonrheumatic aortic valve insufficiency 03/12/2018    Type 2 diabetes mellitus without complication ([facility] 07/24/0770    Coronary artery disease involving native heart without angina pectoris 11/05/2015    Essential hypertension 11/05/2015    Pure hypercholesterolemia 11/05/2015       Assessment/plan: Active Problems:    Left lower lobe consolidation (HCC)  Resolved Problems:    * No resolved hospital problems.  *      Plan:     LLL HCAP versus Viral PNA:  Influenzae B Infection:  Acute Kidney Injury on CKD:  Sinus Tachy at 140s:  Admit to ICU - seems to have improved significantly from OSH, would continue in ICU for now as on Amiodarone GGT  Zosyn 3.375g IV Q8h  Tamiflu dosed renally at 30mg PO QDay  Pharmacy to dose meds as needed as per renal function  Amiodarone GGT started at OSH - will continue  Have held the Metoprolol, as small IV dose was given at OSH with rapid significant drop in both HR and BP  NaHCO3 solution at 100cc/h with 100meq per liter - will defer to nephro from AM  Nephro consult in AM  Cardio consult in AM  Normal Screening labs upon admission collected  Ur Na and Ur Cr and Ur OSM sent  Will defer on renal US  Droplet precautions  2/29/20  Admitted for LLL pneumonia, flu B , acute on CKd, lactic acidosis, hypernatremia and dehydration afib with RVR , placed on amiodaron gtt?  Sean Nuñez MD  Hospitalist Service  2/29/2020  8:13 AM

## 2020-02-29 NOTE — PLAN OF CARE
Nutrition Problem: Severe malnutrition  Intervention: Food and/or Nutrient Delivery: Continue NPO  Nutritional Goals: Pt will progress to an oral diet to meet nutritional needs or needs will be met via RED

## 2020-02-29 NOTE — PROGRESS NOTES
Speech Language Pathology  Facility/Department: [facility] CORONARY CARE UNIT   CLINICAL BEDSIDE SWALLOW EVALUATION  SPEECH PRODUCTION EVALUATION     NAME: Jennifer Hobbs  : 1935  MRN: [id number]    ADMISSION DATE: 2020  ADMITTING DIAGNOSIS: has Type 2 diabetes mellitus without complication ([address] Coronary artery disease involving native heart without angina pectoris; Essential hypertension; Pure hypercholesterolemia; Nonrheumatic aortic valve insufficiency; Left lower lobe consolidation ([address] and Severe malnutrition ([facility] [address] [facility] on their problem list.    Date of Eval: 2020  Evaluating Therapist: Linda Martinez    Reason for Referral  Jennifer Hobbs was referred for a bedside swallow evaluation to assess the efficiency of his swallow function, identify signs and symptoms of aspiration and make recommendations regarding safe dietary consistencies, effective compensatory strategies, and safe eating environment. Impression  Assessed patient's swallowing function. Patient exhibits min attempt at reflexive oral transit and absent reflexive swallows with no laryngeal elevation noted and just min laryngeal rocking observed. Patient exhibited decreased airway detection with no outward coughs/throat clears noted following probable penetration/aspiration of PO trials (1/2 teaspoon trials thin H2O presented by SLP). At this time, recommend continuation NPO status. Daily oral care, via staff, as question management of secretions. If patient receives mouth care prior to intake, okay for swabs of water for comfort. Will continue to follow. Thank you for this consult. Treatment Plan  Requires SLP Intervention: Yes     Recommended Diet and Intervention  Diet Solids Recommendation: NPO  Liquid Consistency Recommendation: NPO  Therapeutic Interventions: Patient/Family education;Oral Care; Therapeutic PO trials with SLP     Treatment/Goals  Timeframe for Short-term Goals: 1x/day for 3 days   Goal 1: Patient NPO. coughs/throat clears noted following probable penetration/aspiration of PO trials.)  Pharyngeal: As previously mentioned, patient exhibited absent reflexive swallows with no laryngeal elevation noted and just min laryngeal rocking observed. Patient exhibited decreased airway detection with no outward coughs/throat clears noted following probable penetration/aspiration of PO trials (1/2 teaspoon trials thin H2O presented by SLP). At this time, recommend continuation NPO status. Daily oral care, via staff, as question management of secretions. If patient receives mouth care prior to intake, okay for swabs of water for comfort. Will continue to follow.      Electronically signed by OSVALDO Yeboah on 2/29/2020 at 11:12 AM

## 2020-02-29 NOTE — PROGRESS NOTES
Nutrition Assessment    Type and Reason for Visit: Initial, Positive Nutrition Screen    Nutrition Recommendations: NPO    Nutrition Assessment: Pt is Severely Malnourished AEB wt loss and inadequate oral intake. Pt is not responsive at this time. Diet changed to NPO. Will continue to monitor nutrition progression and implement nutrition intervention as needed. Recommend SLP consult for swallow function when pt ia able. Malnutrition Assessment:  · Malnutrition Status: Meets the criteria for severe malnutrition  · Context: Acute illness or injury  · Findings of the 6 clinical characteristics of malnutrition (Minimum of 2 out of 6 clinical characteristics is required to make the diagnosis of moderate or severe Protein Calorie Malnutrition based on AND/ASPEN Guidelines):  1. Energy Intake-Less than or equal to 50% of estimated energy requirement, Greater than or equal to 5 days    2.  Weight Loss-10% loss or greater, in 1 month    Nutrition Risk Level: High    Nutrient Needs:  · Estimated Daily Total Kcal: 8653-1606 kcals/day  · Estimated Daily Protein (g): 102 g/PRO/day  · Estimated Daily Total Fluid (ml/day): 2369-3852 mL/day    Nutrition Diagnosis:   · Problem: Severe malnutrition  · Etiology: related to Acute injury/trauma     Signs and symptoms:  as evidenced by Diet history of poor intake, Weight loss greater than or equal to 5% in 1 month    Objective Information:  · Current Nutrition Therapies:  · Oral Diet Orders: NPO   · Anthropometric Measures:  · Ht: 6' (182.9 cm)   · Current Body Wt: 133 lb (60.3 kg)  · BMI Classification: BMI <18.5 Underweight    Nutrition Interventions:   Continue NPO  Continued Inpatient Monitoring    Nutrition Evaluation:   · Evaluation: Goals set   · Goals: Pt will progress to an oral diet to meet nutritional needs or needs will be met via RED    · Monitoring: Nutrition Progression, Weight, Pertinent Labs      Electronically signed by Kat Brooks MS, RD, LD on 2/29/20

## 2020-02-29 NOTE — CONSULTS
Past Surgical History:      Procedure Laterality Date    CATARACT REMOVAL      CORONARY ARTERY BYPASS GRAFT  2010    3V by Dr. Freddy Roberts at [address]      from face    HERNIA REPAIR      KIDNEY STONE SURGERY      TYMPANOSTOMY TUBE PLACEMENT         Allergies:  Beta adrenergic blockers    Past Social History:  Social History     Socioeconomic History    Marital status:      Spouse name: Not on file    Number of children: Not on file    Years of education: Not on file    Highest education level: Not on file   Occupational History    Not on file   Social Needs    Financial resource strain: Not on file    Food insecurity:     Worry: Not on file     Inability: Not on file    Transportation needs:     Medical: Not on file     Non-medical: Not on file   Tobacco Use    Smoking status: Former Smoker    Smokeless tobacco: Never Used   Substance and Sexual Activity    Alcohol use: No    Drug use: Not on file    Sexual activity: Not Currently   Lifestyle    Physical activity:     Days per week: Not on file     Minutes per session: Not on file    Stress: Not on file   Relationships    Social connections:     Talks on phone: Not on file     Gets together: Not on file     Attends Protestant service: Not on file     Active member of club or organization: Not on file     Attends meetings of clubs or organizations: Not on file     Relationship status: Not on file    Intimate partner violence:     Fear of current or ex partner: Not on file     Emotionally abused: Not on file     Physically abused: Not on file     Forced sexual activity: Not on file   Other Topics Concern    Not on file   Social History Narrative    Not on file       Family History:       Problem Relation Age of Onset    Diabetes Father     Heart Disease Father     Cancer Brother        Home Meds:  Prior to Admission medications    Medication Sig Start Date End Date Taking?  Authorizing Provider   potassium chloride 20 MEQ/15ML (10%) oral solution Take 20 mEq by mouth daily    Historical Provider, MD   sitaGLIPtan-metformin (JANUMET)  MG per tablet Take 1 tablet by mouth 2 times daily (with meals)    Historical Provider, MD   docusate (COLACE) 50 MG/5ML liquid Take 150 mg by mouth daily    Historical Provider, MD   metoprolol tartrate (LOPRESSOR) 50 MG tablet Take 50 mg by mouth 2 times daily    Historical Provider, MD   oseltamivir (TAMIFLU) 75 MG capsule Take 75 mg by mouth 2 times daily 2/28/20 3/5/20  Historical Provider, MD   clopidogrel (PLAVIX) 75 MG tablet TAKE 1 TABLET BY MOUTH ONCE DAILY 1/9/20   J LUIS Slater   irbesartan (AVAPRO) 300 MG tablet Take 1 tablet by mouth daily 1/9/20   J LUIS Slater   blood glucose test strips (TRUE METRIX BLOOD GLUCOSE TEST) strip 1 each by Does not apply route daily 8/23/18   J LUIS Slater   aspirin EC 81 MG EC tablet Take 1 tablet by mouth daily 3/12/18   J LUIS Reyna   vitamin B-12 (CYANOCOBALAMIN) 500 MCG tablet Take 500 mcg by mouth daily    Historical Provider, MD   Glucosamine-Chondroitin (GLUCOSAMINE CHONDR COMPLEX PO) Take 2 tablets by mouth daily     Historical Provider, MD   Multiple Vitamins-Minerals (THERAPEUTIC MULTIVITAMIN-MINERALS) tablet Take 1 tablet by mouth daily.     Historical Provider, MD       Current Meds:   oseltamivir 6mg/ml  30 mg Oral Daily    metoprolol  2.5 mg Intravenous Q6H    insulin lispro  0-12 Units Subcutaneous TID WC    insulin lispro  0-6 Units Subcutaneous Nightly    furosemide  40 mg Intravenous Once    ipratropium-albuterol  1 ampule Inhalation Q4H WA    piperacillin-tazobactam  3.375 g Intravenous Q8H    aspirin EC  81 mg Oral Daily    atorvastatin  40 mg Oral Nightly    clopidogrel  75 mg Oral Daily    therapeutic multivitamin-minerals  1 tablet Oral Daily    pantoprazole  40 mg Oral QAM AC    vitamin B-12  500 mcg Oral Daily    insulin glargine  15 Units Subcutaneous Nightly    sodium chloride flush  10 mL Intravenous 2 times per day       Current Infused Meds:   sodium bicarbonate infusion Stopped (02/29/20 1146)    dextrose 100 mL/hr at 02/29/20 1145    dilTIAZem (CARDIZEM) 125 mg in dextrose 5% 125 mL infusion 20 mg/hr (02/29/20 1457)    heparin (porcine) 12 Units/kg/hr (02/29/20 1502)    amiodarone 1 mg/min (02/29/20 0820)    dextrose         Physical Exam:  Vitals:    02/29/20 1700   BP: 108/68   Pulse: 116   Resp: 21   Temp:    SpO2: 93%       Intake/Output Summary (Last 24 hours) at 2/29/2020 1758  Last data filed at 2/29/2020 1600  Gross per 24 hour   Intake 2381.23 ml   Output 475 ml   Net 1906.23 ml     Estimated body mass index is 18.16 kg/m² as calculated from the following:    Height as of this encounter: 6' (1.829 m). Weight as of this encounter: 133 lb 14.4 oz (60.7 kg). Physical Exam  Constitutional:       Appearance: He is well-developed. Comments: Arousable but not responsive to verbal   HENT:      Mouth/Throat:      Pharynx: No oropharyngeal exudate. Eyes:      General: No scleral icterus. Right eye: No discharge. Left eye: No discharge. Neck:      Thyroid: No thyromegaly. Vascular: No JVD. Cardiovascular:      Rate and Rhythm: Tachycardia present. Rhythm irregular. Heart sounds: No murmur. No friction rub. No gallop. Pulmonary:      Effort: No respiratory distress. Breath sounds: No stridor. Rales present. No wheezing. Abdominal:      General: Bowel sounds are normal. There is no distension. Palpations: Abdomen is soft. There is no mass. Tenderness: There is no abdominal tenderness. There is no guarding or rebound. Musculoskeletal:         General: No deformity. Skin:     General: Skin is warm. Coloration: Skin is not pale. Findings: No erythema or rash. Neurological:      Motor: No abnormal muscle tone.       Coordination: Coordination normal.      Deep Tendon Reflexes: Reflexes normal.           Labs:  Recent Labs     02/29/20  0138   WBC 14.2*   HGB 18.0          Recent Labs     02/28/20  2321 02/29/20  0138 02/29/20  0503 02/29/20  1142   * 152*  --   --    K 4.1 4.1  --  3.2   * 116*  --   --    CO2 18* 17*  --   --    BUN 41* 42*  --   --    CREATININE 2.3* 2.1*  --   --    LABGLOM 27* 30*  --   --    MG 1.8  --  2.4  --    CALCIUM 8.9 8.9  --   --    PHOS 2.6  --  2.7  --        CK, CKMB, Troponin: @LABRCNT (CKTOTAL:3, CKMB:3, TROPONINI:3)@    Last 3 BNP:          IMAGING:  Us Renal Complete    Result Date: 2/29/2020  EXAMINATION:  US RENAL COMPLETE  2/29/2020 11:42 AM HISTORY: Acute renal insufficiency. COMPARISON: No comparison study. TECHNIQUE: Grayscale and color flow imaging was performed. FINDINGS: The right kidney measures 9.9 cm and the left kidney measures 9.5 cm. The cortical thickness and echogenicity are normal. There is a small septated cyst in the lower pole right kidney measuring up to 1.3 cm. There is no hydronephrosis. The prostate is prominent measuring 3.9 x 4.3 x 3.7 cm.    1. The renal size, cortical thickness and echogenicity are within normal limits. No hydronephrosis. 2. Probable small septated cyst in the lower pole right kidney. Given the septation, consider follow-up in 6 months. 3. Prostate is enlarged. Signed by Dr Shayne Espana on 2/29/2020 11:44 AM          Assessment and Plan: This is a 82y.o. year old male with past medical history of hypertension, hyperlipidemia, advanced dementia, diabetes mellitus, coronary artery disease with prior CABG 2010, preserved LV systolic function with progressive deterioration in mental status, recent nursing home resident, presenting with possible left lower lobe pneumonia with sinus tachycardia now deteriorated to atrial fibrillation with RVR, acute kidney injury. 1. Would obtain rate control with IV Cardizem and IV Lopressor therapy.  Patient was initiated on IV amiodarone as presumed new onset atrial fibrillation. Initial EKGs do show sinus tachycardia. No prior documented history of atrial fibrillation. Would initiate IV heparin and low-dose protocol. Can add digoxin IV 0.125 mg but would hold if renal deterioration. 2. Obtain echocardiogram. Noted elevated BNP of 9000. Clinically no evidence of volume retention with no JVD or lower extremity edema. Obtain chest x-ray.         Electronically signed by Aishwarya Lozano MD on 2/29/2020 at 5:58 PM

## 2020-02-29 NOTE — CONSULTS
Use    Smoking status: Former Smoker    Smokeless tobacco: Never Used   Substance and Sexual Activity    Alcohol use: No    Drug use: None    Sexual activity: Not Currently   Lifestyle    Physical activity:     Days per week: None     Minutes per session: None    Stress: None   Relationships    Social connections:     Talks on phone: None     Gets together: None     Attends Voodoo service: None     Active member of club or organization: None     Attends meetings of clubs or organizations: None     Relationship status: None    Intimate partner violence:     Fear of current or ex partner: None     Emotionally abused: None     Physically abused: None     Forced sexual activity: None   Other Topics Concern    None   Social History Narrative    None       Physical Exam     Blood pressure 136/65, pulse 128, temperature 98.7 °F (37.1 °C), temperature source Temporal, resp. rate 28, height 6' (1.829 m), weight 133 lb 14.4 oz (60.7 kg), SpO2 95 %. General:  NAD, awake, ill-appearing, normal body habitus  HEENT: atraumatic, normocephalic  Neck:  Supple, normal range of movement  Chest:  Bilateral air entry, decreased breath sounds at the bases  CV:  RRR, soft systolic murmur  Abdomen:  NTND, soft, +BS, no hepatosplenomegaly  Extremities:  No peripheral edema  Neurological:  Moving all four extremities  Lymphatics:  No palpable lymph nodes   Skin:  No rash, no jaundice  Psychiatric:  Confused    Data     Recent Labs     02/29/20  0138   WBC 14.2*   HGB 18.0   HCT 55.5*   .3*        Recent Labs     02/28/20  2321 02/29/20  0138 02/29/20  0503   * 152*  --    K 4.1 4.1  --    * 116*  --    CO2 18* 17*  --    GLUCOSE 349* 353*  --    PHOS 2.6  --  2.7   MG 1.8  --  2.4   BUN 41* 42*  --    CREATININE 2.3* 2.1*  --    LABGLOM 27* 30*  --        Assessment:  1. SONIA (pre renal azotemia vs ATN)  2. CKD stage 3  3. Pneumonia  4. Influenza  5. Dementia  6. Hypocarbia  7. Hypernatremia  8.  Type

## 2020-02-29 NOTE — PROGRESS NOTES
After speaking with Dr. Charley Spain regarding patient care patient's son voiced to me that the family has decided to change code status to DNR with continued aggressive measures. Notified Dr. Charley Spain.

## 2020-02-29 NOTE — PROGRESS NOTES
PHARMACY NOTE  Amydelilahfracisco Armaniirineo was ordered Glucosamine/chondroitin. Per the [address], this medication is non-formulary and not stocked by pharmacy. It has been discontinued. The medication can be reordered at discharge.      Electronically signed by Alva Dean [facility] on 2/28/2020 at 11:58 PM

## 2020-02-29 NOTE — PROGRESS NOTES
Dr. Ballard Rings at bedside. Patient continues to be warm, dry, and alert. Heart rate continues at 160's. Orders obtained.  Will continue to monitor

## 2020-02-29 NOTE — H&P
Patient Information:  Patient:Ty Velasquez  YOB: 1935  Date of Service: 2/28/2020  MRN: [id number]   Acct: [de-identified]   Primary Care Physician: J LUIS Amaral  Advance Directive: changed here to [facility] by wife from Full Code, then back to Full Code  Health Care Proxy: his wife, Mrs. Edgar Stearns Date: 2/28/2020       Hospital Day: 1        SUBJECTIVE:    No chief complaint on file. Referring Doc Sign Out:  He was admitted yesterday to their facility for LLL HCAP for which he has been on Zosyn  His Cr increased from 1.3 to 2.2 over the course of the hospital day  He has become sinus tachycardic with rates in the 140s  He is full code (changed here to [facility] by wife, family is further discussing at this time)  Hx CAD on Plavix  Got liter IVF with little change in HR at OSH  Sodium level increasing, last at 150  Hb at 19.9 and WBC at 14.7 with PLT count at 261    HPI:  Mr. Sherice Cruz is a 82year old  american gentleman transferred from OSH who has history of end-stage dementia and had been in the nursing home for 2 weeks prior. He has been deteriorating over the last few months since Jose G time. He has known dementia for about two years. He now barely has been speaking with the family for the last two months, will chew food for a long time and needs prompting to swallow, has been losing weight, and progressively the PO intake has decreased. He has deteriorated espeically over the last few weeks. His wife had to be in the hospital for surgery, and following her return he has continued to worsen. He was placed to the NH two weeks ago, they had sent him to the ED at [facility] earlier today where he had been admitted. His wife states that the NH stated that they were starting him on Tamiflu, but that he would not have taken any of it.       ROS:  Not possible as per patient general baseline medical condition    Past Medical History:   Diagnosis Date    40 mg Oral Nightly Tata Neumann MD        clopidogrel (PLAVIX) tablet 75 mg  75 mg Oral Daily Tata Neumann MD        memantine [facility]) tablet 10 mg  10 mg Oral BID Tata Neumann MD        therapeutic multivitamin-minerals 1 tablet  1 tablet Oral Daily Tata Neumann MD        pantoprazole (PROTONIX) tablet 40 mg  40 mg Oral QAM AC Tata Neumann MD        rivastigmine (EXELON) 4.6 MG/24HR 1 patch  1 patch Transdermal Daily Tata Neumann MD        vitamin B-12 (CYANOCOBALAMIN) tablet 500 mcg  500 mcg Oral Daily Tata Neumann MD        insulin glargine (LANTUS) injection vial 15 Units  15 Units Subcutaneous Nightly Tata Neumann MD   15 Units at 02/29/20 0021    glucose (GLUTOSE) 40 % oral gel 15 g  15 g Oral PRN Tata Neumann MD        dextrose 50 % IV solution  12.5 g Intravenous PRN Tata Neumann MD        glucagon (rDNA) injection 1 mg  1 mg Intramuscular PRN Tata Neumann MD        dextrose 5 % solution  100 mL/hr Intravenous PRN Tata Neumann MD        sodium chloride flush 0.9 % injection 10 mL  10 mL Intravenous 2 times per day Tata Neumann MD   10 mL at 02/29/20 0036    sodium chloride flush 0.9 % injection 10 mL  10 mL Intravenous PRN Tata Neumann MD        polyethylene glycol [facility]) packet 17 g  17 g Oral Daily PRN Tata Neumann MD        melatonin tablet 3 mg  3 mg Oral Nightly PRN Tata Neumann MD        ondansetron (ZOFRAN-ODT) disintegrating tablet 4 mg  4 mg Oral Q8H PRN Tata Neumann MD        Or    ondansetron [facility]) injection 4 mg  4 mg Intravenous Q6H PRN Tata Neumann MD        heparin (porcine) injection 5,000 Units  5,000 Units Subcutaneous 3 times per day Tata Neumann MD   5,000 Units at 02/29/20 0119     Home Medications:  Prior to Admission medications    Medication Sig Start Date End Date Taking?  Authorizing Provider   rivastigmine (EXELON) 4.6 MG/24HR Place 1 patch onto the skin daily 1/14/20   Alba Moryl, APRN   memantine (NAMENDA) 10 MG Exam  Vitals signs reviewed. Constitutional:       General: He is not in acute distress. Appearance: He is not ill-appearing or toxic-appearing. HENT:      Nose: No congestion or rhinorrhea. Eyes:      General:         Right eye: No discharge. Left eye: No discharge. Neck:      Musculoskeletal: Neck supple. Comments: Trachea appears midline  Cardiovascular:      Rate and Rhythm: Regular rhythm. Tachycardia present. Heart sounds: No murmur. No friction rub. No gallop. Pulmonary:      Effort: Pulmonary effort is normal. No respiratory distress. Breath sounds: No stridor. No wheezing, rhonchi or rales. Abdominal:      General: Bowel sounds are normal.      Tenderness: There is no abdominal tenderness. There is no guarding or rebound. Skin:     General: Skin is warm. Comments: nondiaphoretic   Neurological:      Mental Status: Mental status is at baseline. He is lethargic. Psychiatric:      Comments: Can not assess as per general medical condition         LABORATORY DATA:    CBC:No results for input(s): WBC, HGB, HCT, PLT in the last 72 hours.     BMP:  Recent Labs     02/28/20  2321   *   K 4.1   *   CO2 18*   BUN 41*   CREATININE 2.3*   CALCIUM 8.9   PHOS 2.6     Recent Labs     02/28/20  2321   AST 35   ALT 18   BILITOT 0.8   ALKPHOS 90     Coag Panel:   Recent Labs     02/28/20  2321   INR 1.53*   PROTIME 18.5*   APTT 32.1     Cardiac Enzymes:   Recent Labs     02/28/20  2321   TROPONINI 0.02     A1C:   Recent Labs     02/28/20  2321   LABA1C 6.9*     IMAGING:  Had LLL Infiltrate on CXR at OSH        ASESSMENTS & PLANS:    LLL HCAP versus Viral PNA:  Influenzae B Infection:  Acute Kidney Injury on CKD:  Sinus Tachy at 140s:  Admit to ICU - seems to have improved significantly from OSH, would continue in ICU for now as on Amiodarone GGT  Zosyn 3.375g IV Q8h  Tamiflu dosed renally at 30mg PO QDay  Pharmacy to dose meds as needed as per renal

## 2020-03-01 NOTE — PROGRESS NOTES
[facility] Hospitalists      Patient:  Juan Diego Alfonso  YOB: 1935  Date of Service: 3/1/2020  MRN: [id number]   Acct: [de-identified]   Primary Care Physician: J LUIS Delatorre  Advance Directive: DNR-CCA  Admit Date: 2/28/2020       Hospital Day: 2    Chief Complaint: No chief complaint on file. Referring Doc Sign Out:  He was admitted yesterday to their facility for LLL HCAP for which he has been on Zosyn  His Cr increased from 1.3 to 2.2 over the course of the hospital day  He has become sinus tachycardic with rates in the 140s  He is full code (changed here to [facility] by wife, family is further discussing at this time)  Hx CAD on Plavix  Got liter IVF with little change in HR at OSH  Sodium level increasing, last at 150  Hb at 19.9 and WBC at 14.7 with PLT count at 261     HPI:  Mr. Juan Diego Alfonso is a 82year old  american gentleman transferred from OSH who has history of end-stage dementia and had been in the nursing home for 2 weeks prior. He has been deteriorating over the last few months since Jose G time. He has known dementia for about two years. He now barely has been speaking with the family for the last two months, will chew food for a long time and needs prompting to swallow, has been losing weight, and progressively the PO intake has decreased. He has deteriorated espeically over the last few weeks. His wife had to be in the hospital for surgery, and following her return he has continued to worsen. He was placed to the NH two weeks ago, they had sent him to the ED at [facility] earlier today where he had been admitted. His wife states that the NH stated that they were starting him on Tamiflu, but that he would not have taken any of it.       Subjective:lethargic, barely responsive, spoke to family in details   3/1/20  Family decided DNR, BC 2/2 G+ cocci, awaiting ID, CR down to 1.7, hypernatremia resolved, US renal no hydro, NPO, on cardizem gtt and amiodaron gtt and heparin , appreciate nephrology, cards , swallow eval in am if fails will consult  hospice   2/29/20  Admitted for LLL pneumonia, flu B , acute on CKd, lactic acidosis, hypernatremia and dehydration afib with RVR , placed on amiodaron gtt? Zosyn tamiflu   Objective:   VITALS:  BP (!) 119/58   Pulse 87   Temp 98.3 °F (36.8 °C) (Temporal)   Resp 25   Ht 6' (1.829 m)   Wt 133 lb 14.4 oz (60.7 kg)   SpO2 97%   BMI 18.16 kg/m²   24HR INTAKE/OUTPUT:      Intake/Output Summary (Last 24 hours) at 3/1/2020 5585  Last data filed at 3/1/2020 0600  Gross per 24 hour   Intake 3983.74 ml   Output 875 ml   Net 3108.74 ml      General appearance: lethargic   HEENT: atraumatic, eyes with clear conjunctiva and normal lids  Lungs:  increased work of breathing, diminished breath sounds bilaterally  Heart: S1, S2 normal  Abdomen: soft, non-tender; bowel sounds normal; no masses,  no organomegaly  Extremities:atraumatic, no cyanosis no edema no calf tenderness   Neurologic:lethargic   Skin: no rashes, nodules.           Medications:      sodium bicarbonate infusion Stopped (02/29/20 1146)    dextrose 100 mL/hr at 02/29/20 2250    dilTIAZem (CARDIZEM) 125 mg in dextrose 5% 125 mL infusion 20 mg/hr (03/01/20 0109)    heparin (porcine) 9 Units/kg/hr (03/01/20 0455)    amiodarone 0.5 mg/min (03/01/20 0550)    dextrose        digoxin  125 mcg Intravenous Daily    oseltamivir 6mg/ml  30 mg Oral Daily    metoprolol  2.5 mg Intravenous Q6H    insulin lispro  0-12 Units Subcutaneous TID WC    insulin lispro  0-6 Units Subcutaneous Nightly    furosemide  40 mg Intravenous Once    levalbuterol  0.63 mg Nebulization Q4H    vancomycin (VANCOCIN) intermittent dosing (placeholder)   Other RX Placeholder    piperacillin-tazobactam  3.375 g Intravenous Q8H    aspirin EC  81 mg Oral Daily    atorvastatin  40 mg Oral Nightly    clopidogrel  75 mg Oral Daily    therapeutic multivitamin-minerals  1 tablet Oral Daily    pantoprazole  40 mg Oral QAM AC    vitamin B-12  500 mcg Oral Daily    insulin glargine  15 Units Subcutaneous Nightly    sodium chloride flush  10 mL Intravenous 2 times per day     heparin (porcine), heparin (porcine), naloxone, morphine, morphine, morphine, glucose, dextrose, glucagon (rDNA), dextrose, sodium chloride flush, polyethylene glycol, melatonin, ondansetron **OR** ondansetron  Diet NPO Effective Now         Lab and other Data:     Recent Labs     02/29/20  0138 03/01/20  0406   WBC 14.2* 13.5*   HGB 18.0 15.9    179     Recent Labs     02/28/20  2321 02/29/20  0138 02/29/20  1142 03/01/20  0406   * 152*  --  137   K 4.1 4.1 3.2 3.4*  3.4*   * 116*  --  99   CO2 18* 17*  --  20*   BUN 41* 42*  --  35*   CREATININE 2.3* 2.1*  --  1.7*   GLUCOSE 349* 353*  --  362*     Recent Labs     02/28/20  2321   AST 35   ALT 18   BILITOT 0.8   ALKPHOS 90     Troponin T:   Recent Labs     02/29/20  0138 02/29/20  0503 02/29/20  0800   TROPONINI 0.01 <0.01 <0.01     Pro-BNP: No results for input(s): BNP in the last 72 hours. INR:   Recent Labs     02/28/20  2321   INR 1.53*     ABGs:   Lab Results   Component Value Date    PHART 7.520 02/29/2020    PO2ART 50.0 02/29/2020    JLG6UAF 26.0 02/29/2020     UA:  Recent Labs     02/29/20  0400   COLORU DK YELLOW   PHUR 5.5   LABCAST 0-1 RBC*   RBCUA 3-5*   CLARITYU CLOUDY*   SPECGRAV 1.028   LEUKOCYTESUR Negative   UROBILINOGEN 1.0   BILIRUBINUR SMALL*   BLOODU LARGE*   GLUCOSEU 500*       Imaging:   XR CHEST PORTABLE   Final Result   1. Bilateral lower lung pneumonia, much greater on the left. Signed by Dr Arash Forte on 2/29/2020 8:01 PM      US RENAL COMPLETE   Final Result   1. The renal size, cortical thickness and echogenicity are within   normal limits. No hydronephrosis. 2. Probable small septated cyst in the lower pole right kidney. Given   the septation, consider follow-up in 6 months. 3. Prostate is enlarged.    Signed by Dr Destiny Agudelo Douglas Mayfield on 2/29/2020 11:44 AM        Micro:   Blood Culture, Routine   Date Value Ref Range Status   02/29/2020 (A)  Preliminary    Gram stain Aerobic bottle:  Gram positive cocci in clusters  resembling Staphylococcus  Culture in progress  Please notify Physician   Bottle volume = <5 ml  Quality of results might be  affected with low volume.     , No components found for: LABURINE  Patient Active Problem List    Diagnosis Date Noted    Severe malnutrition ([facility] [address] 02/29/2020    Left lower lobe consolidation ([facility] [address] 02/28/2020    Nonrheumatic aortic valve insufficiency 03/12/2018    Type 2 diabetes mellitus without complication ([facility] [address] 21/05/3170    Coronary artery disease involving native heart without angina pectoris 11/05/2015    Essential hypertension 11/05/2015    Pure hypercholesterolemia 11/05/2015       Assessment/plan: Active Problems:    Left lower lobe consolidation (HCC)    Severe malnutrition (HCC)  Resolved Problems:    * No resolved hospital problems. *      Plan:     LLL HCAP versus Viral PNA:  Influenzae B Infection:  Acute Kidney Injury on CKD:  Sinus Tachy at 140s:  Admit to ICU - seems to have improved significantly from OSH, would continue in ICU for now as on Amiodarone GGT  Zosyn 3.375g IV Q8h  Tamiflu dosed renally at 30mg PO QDay  Pharmacy to dose meds as needed as per renal function  Amiodarone GGT started at OSH - will continue  Have held the Metoprolol, as small IV dose was given at OSH with rapid significant drop in both HR and BP  NaHCO3 solution at 100cc/h with 100meq per liter - will defer to nephro from AM  Nephro consult in AM  Cardio consult in AM  Normal Screening labs upon admission collected  Ur Na and Ur Cr and Ur OSM sent  Will defer on renal US  Droplet precautions  2/29/20  Admitted for LLL pneumonia, flu B , acute on CKd, lactic acidosis, hypernatremia and dehydration afib with RVR , placed on amiodaron gtt?  Zosyn tamiflu   3/1/20  Family decided DNR, BC 2/2 G+ cocci,

## 2020-03-01 NOTE — PROGRESS NOTES
Dr. Phill Springer has been here to see patient. He discussed placement of dobhoff/NG with family to assist in medication and nutrition administration. Family will discuss and let nursing know decision regarding placement.

## 2020-03-01 NOTE — PROGRESS NOTES
Nephrology ([address]) Progress Note    Patient:  Camila Almaguer  YOB: 1935  Date of Service: 3/1/2020  MRN: [id number]   Acct: [de-identified]   Primary Care Physician: Drema Brunner, APRN  Advance Directive: DNR-CCA  Admit Date: 2/28/2020       Hospital Day: 2  Referring Provider: José Miguel Ho MD    Patient Seen, Chart, Consults notes, Labs, Radiology studies reviewed. Subjective:  Mr. Camila Almaguer is a 82year old man transferred from [facility] where he was managed for pneumonia. He was recently admitted to a nursing facility for failure to thrive and worsening dementia. His oral intake and his weight have decreased. He was also diagnosed with influenza infection and was started on Tamiflu. Patient is currently in CCU at [facility]. He is awake but tired. His renal function has been abnormal but he is not under the care of nephrology. His creatinine was  1.4 (baseline) and it has risen to 2.3 on admission. His serum sodium was 150. Renal service was consulted to manage his SONIA, CKD stage 3 and hypernatremia. Patient is lethargic, poorly responsive.      Allergies:  Beta adrenergic blockers    Medicines:  Current Facility-Administered Medications   Medication Dose Route Frequency Provider Last Rate Last Dose    digoxin (LANOXIN) injection 125 mcg  125 mcg Intravenous Daily Claire Escobedo MD        oseltamivir 6mg/ml (TAMIFLU) suspension 30 mg  30 mg Oral Daily Claire Escobedo MD        sodium bicarbonate 100 mEq in dextrose 5 % 1,000 mL infusion   Intravenous Continuous Claire Escobedo MD   Stopped at 02/29/20 1146    metoprolol (LOPRESSOR) injection 2.5 mg  2.5 mg Intravenous Q6H Claire Escobedo MD   2.5 mg at 03/01/20 0335    insulin lispro (HUMALOG) injection vial 0-12 Units  0-12 Units Subcutaneous TID  José Miguel Ho MD   4 Units at 02/29/20 1301    insulin lispro (HUMALOG) injection vial 0-6 Units  0-6 Units Subcutaneous Nightly Oral Daily Minus MD Nelson        pantoprazole (PROTONIX) tablet 40 mg  40 mg Oral QAM AC Minus MD Nelson        vitamin B-12 (CYANOCOBALAMIN) tablet 500 mcg  500 mcg Oral Daily Minus MD Nelson        insulin glargine (LANTUS) injection vial 15 Units  15 Units Subcutaneous Nightly Minus MD Nelson   15 Units at 02/29/20 2033    glucose (GLUTOSE) 40 % oral gel 15 g  15 g Oral PRN Minus MD Nelson        dextrose 50 % IV solution  12.5 g Intravenous PRN Minus MD Nelson        glucagon (rDNA) injection 1 mg  1 mg Intramuscular PRN Minus MD Nelson        dextrose 5 % solution  100 mL/hr Intravenous PRN Minus MD Nelson        sodium chloride flush 0.9 % injection 10 mL  10 mL Intravenous 2 times per day Minus MD Nelson   10 mL at 02/29/20 2035    sodium chloride flush 0.9 % injection 10 mL  10 mL Intravenous PRN Minus MD Nelson        polyethylene glycol [facility]) packet 17 g  17 g Oral Daily PRN Minus MD Nelson        melatonin tablet 3 mg  3 mg Oral Nightly PRN Minus MD Nelson        ondansetron (ZOFRAN-ODT) disintegrating tablet 4 mg  4 mg Oral Q8H PRN Minus MD Nelson        Or    ondansetron [facility]) injection 4 mg  4 mg Intravenous Q6H PRN Minus MD Nelson           Past Medical History:  Past Medical History:   Diagnosis Date    CAD (coronary artery disease)     COPD (chronic obstructive pulmonary disease) ([facility] [address]     Hyperlipidemia     Hypertension     Type II or unspecified type diabetes mellitus without mention of complication, not stated as uncontrolled        Past Surgical History:  Past Surgical History:   Procedure Laterality Date    CATARACT REMOVAL      CORONARY ARTERY BYPASS GRAFT  2010    3V by Dr. Timoteo Sutton at [address] [place] [address],[address]      from face    HERNIA REPAIR      KIDNEY STONE SURGERY      TYMPANOSTOMY TUBE PLACEMENT         Family History  Family History   Problem Relation Age of Onset    Diabetes Father     Heart Disease Father     Cancer cyanosis or edema  Skin: warm and dry without rash    Labs:  BMP:   Recent Labs     02/28/20  2321 02/29/20  0138 02/29/20  0503 02/29/20  1142 03/01/20  0406   * 152*  --   --  137   K 4.1 4.1  --  3.2 3.4*  3.4*   * 116*  --   --  99   CO2 18* 17*  --   --  20*   PHOS 2.6  --  2.7  --   --    BUN 41* 42*  --   --  35*   CREATININE 2.3* 2.1*  --   --  1.7*   CALCIUM 8.9 8.9  --   --  7.8*     CBC:   Recent Labs     02/29/20 0138 03/01/20  0406   WBC 14.2* 13.5*   HGB 18.0 15.9   HCT 55.5* 48.4   .3* 100.4*    179     LIVER PROFILE:   Recent Labs     02/28/20  2321   AST 35   ALT 18   BILITOT 0.8   ALKPHOS 90     PT/INR:   Recent Labs     02/28/20  2321   PROTIME 18.5*   INR 1.53*     APTT:   Recent Labs     02/29/20  1345 02/29/20  2115 03/01/20  0406   APTT 55.1* 119.2* 81.1*     BNP:  No results for input(s): BNP in the last 72 hours. Ionized Calcium:No results for input(s): IONCA in the last 72 hours. Magnesium:  Recent Labs     02/28/20  2321 02/29/20  0503 03/01/20  0406   MG 1.8 2.4 1.7     Phosphorus:  Recent Labs     02/28/20  2321 02/29/20  0503   PHOS 2.6 2.7     HgbA1C:   Recent Labs     02/28/20  2321   LABA1C 6.9*     Hepatic:   Recent Labs     02/28/20 2321   ALKPHOS 90   ALT 18   AST 35   PROT 6.3*   BILITOT 0.8   LABALBU 2.8*     Lactic Acid:   Recent Labs     02/29/20  0503   LACTA 3.5*     Troponin:   Recent Labs     03/01/20  0406   CKTOTAL 388*     ABGs: No results for input(s): PH, PCO2, PO2, HCO3, O2SAT in the last 72 hours. CRP:  No results for input(s): CRP in the last 72 hours. Sed Rate:  No results for input(s): SEDRATE in the last 72 hours. Cultures:   No results for input(s): CULTURE in the last 72 hours. Radiology reports as per the Radiologist  Radiology: Us Renal Complete    Result Date: 2/29/2020  EXAMINATION:  US RENAL COMPLETE  2/29/2020 11:42 AM HISTORY: Acute renal insufficiency. COMPARISON: No comparison study.  TECHNIQUE: Grayscale and color flow imaging was performed. FINDINGS: The right kidney measures 9.9 cm and the left kidney measures 9.5 cm. The cortical thickness and echogenicity are normal. There is a small septated cyst in the lower pole right kidney measuring up to 1.3 cm. There is no hydronephrosis. The prostate is prominent measuring 3.9 x 4.3 x 3.7 cm.    1. The renal size, cortical thickness and echogenicity are within normal limits. No hydronephrosis. 2. Probable small septated cyst in the lower pole right kidney. Given the septation, consider follow-up in 6 months. 3. Prostate is enlarged. Signed by Dr Marja Mohs on 2/29/2020 11:44 AM    Xr Chest Portable    Result Date: 2/29/2020  XR CHEST PORTABLE 2/29/2020 5:15 PM HISTORY: Pneumonia COMPARISON: None. FINDINGS: Frontal view of the chest was obtained. Moderate to marked opacities are seen throughout the left midlung and lower lung. Mild opacities are also seen in the right lower lobe. Changes of previous CABG are noted. The osseous structures and surrounding soft tissues demonstrate no acute abnormality. 1. Bilateral lower lung pneumonia, much greater on the left. Signed by Dr Jairo Hathaway on 2/29/2020 8:01 PM       Assessment     1. SONIA (ATN)- imrpoviong  2. CKD stage 3  3. Pneumonia  4. Influenza  5. Dementia  6. Resp alkalosis  7. Hypernatremia- better  8. Type 2 DM with renal disease    Plan:  Continue gentle IV hydration and follow up labs.

## 2020-03-01 NOTE — PROGRESS NOTES
Physical Therapy   Name: Filomena Fam  MRN:  [id number]  Date of service:  3/1/2020  Pt not appropriate for PT today due to respiratory status, per RN, April Kidney. Will f/u at a later time.   Electronically signed by Brie Yu PT on 3/1/2020 at 3:56 PM

## 2020-03-01 NOTE — PROGRESS NOTES
TROPONINI:3)@    IMAGING:  Us Renal Complete    Result Date: 2/29/2020  EXAMINATION:  US RENAL COMPLETE  2/29/2020 11:42 AM HISTORY: Acute renal insufficiency. COMPARISON: No comparison study. TECHNIQUE: Grayscale and color flow imaging was performed. FINDINGS: The right kidney measures 9.9 cm and the left kidney measures 9.5 cm. The cortical thickness and echogenicity are normal. There is a small septated cyst in the lower pole right kidney measuring up to 1.3 cm. There is no hydronephrosis. The prostate is prominent measuring 3.9 x 4.3 x 3.7 cm.    1. The renal size, cortical thickness and echogenicity are within normal limits. No hydronephrosis. 2. Probable small septated cyst in the lower pole right kidney. Given the septation, consider follow-up in 6 months. 3. Prostate is enlarged. Signed by Dr Joseluis Tipton on 2/29/2020 11:44 AM    Xr Chest Portable    Result Date: 2/29/2020  XR CHEST PORTABLE 2/29/2020 5:15 PM HISTORY: Pneumonia COMPARISON: None. FINDINGS: Frontal view of the chest was obtained. Moderate to marked opacities are seen throughout the left midlung and lower lung. Mild opacities are also seen in the right lower lobe. Changes of previous CABG are noted. The osseous structures and surrounding soft tissues demonstrate no acute abnormality. 1. Bilateral lower lung pneumonia, much greater on the left. Signed by Dr Lily Infante on 2/29/2020 8:01 PM        Assessment and Plan: This is a 82y.o. year old male with past medical history of hypertension, hyperlipidemia, advanced dementia, diabetes mellitus, coronary artery disease with prior CABG 2010, preserved LV systolic function with progressive deterioration in mental status, recent nursing home resident, presenting with possible left lower lobe pneumonia with sinus tachycardia now deteriorated to atrial fibrillation with RVR, acute kidney injury.     1. Appears to be in sinus rhythm. EKG requested. Renal functions have improved.  If family agrees would place an NG tube so as oral medications can be given an IV can be discontinued. Would then start Eliquis 2.5 g twice daily and oral Cardizem  mg twice daily.       Stu English MD 3/1/2020 3:13 PM

## 2020-03-01 NOTE — PROGRESS NOTES
Repositioned patient in the bed. OT suctioned with return of moderate amount of thick, yellow secretions. Tylenol suppository given for fever and generalized discomfort.

## 2020-03-02 PROBLEM — Z51.5 PALLIATIVE CARE PATIENT: Status: ACTIVE | Noted: 2020-01-01

## 2020-03-02 NOTE — PROGRESS NOTES
decrease in 2 months  · Ideal Body Wt: 178 lb (80.7 kg), % Ideal Body 74.9%  · BMI Classification: BMI <18.5 Underweight    Nutrition Interventions:   Continue NPO  Continued Inpatient Monitoring, Speech Therapy    Nutrition Evaluation:   · Evaluation: No progress toward goals   · Goals: Pt will progress to an oral diet to meet nutritional needs or needs will be met via RED    · Monitoring: Nutrition Progression, Skin Integrity, Weight, Pertinent Labs, Chewing/Swallowing      Electronically signed by Braxton Fitzgerald, MS, RD, LD on 3/2/20 at 1:56 PM    Contact Number: [phone]

## 2020-03-02 NOTE — PROGRESS NOTES
[facility] Hospitalists      Patient:  Toi Tobin  YOB: 1935  Date of Service: 3/2/2020  MRN: [id number]   Acct: [de-identified]   Primary Care Physician: J LUIS Johnson  Advance Directive: DNR-CCA  Admit Date: 2/28/2020       Hospital Day: 3    Chief Complaint: No chief complaint on file. Referring Doc Sign Out:  He was admitted yesterday to their facility for LLL HCAP for which he has been on Zosyn  His Cr increased from 1.3 to 2.2 over the course of the hospital day  He has become sinus tachycardic with rates in the 140s  He is full code (changed here to [facility] by wife, family is further discussing at this time)  Hx CAD on Plavix  Got liter IVF with little change in HR at OSH  Sodium level increasing, last at 150  Hb at 19.9 and WBC at 14.7 with PLT count at 261     HPI:  Mr. Toi Tobin is a 82year old  american gentleman transferred from OSH who has history of end-stage dementia and had been in the nursing home for 2 weeks prior. He has been deteriorating over the last few months since [place] time. He has known dementia for about two years. He now barely has been speaking with the family for the last two months, will chew food for a long time and needs prompting to swallow, has been losing weight, and progressively the PO intake has decreased. He has deteriorated espeically over the last few weeks. His wife had to be in the hospital for surgery, and following her return he has continued to worsen. He was placed to the NH two weeks ago, they had sent him to the ED at [facility] earlier today where he had been admitted. His wife states that the NH stated that they were starting him on Tamiflu, but that he would not have taken any of it. Subjective:more  Responsive today , spoke to family in details  3/2/20  Admitted for LLL pneumonia, flu B , acute on CKd, lactic acidosis, hypernatremia and dehydration afib with RVR , placed on amiodaron gtt?  Zosyn lispro  0-12 Units Subcutaneous TID WC    insulin lispro  0-6 Units Subcutaneous Nightly    furosemide  40 mg Intravenous Once    levalbuterol  0.63 mg Nebulization Q4H    vancomycin (VANCOCIN) intermittent dosing (placeholder)   Other RX Placeholder    piperacillin-tazobactam  3.375 g Intravenous Q8H    aspirin EC  81 mg Oral Daily    atorvastatin  40 mg Oral Nightly    clopidogrel  75 mg Oral Daily    therapeutic multivitamin-minerals  1 tablet Oral Daily    pantoprazole  40 mg Oral QAM AC    vitamin B-12  500 mcg Oral Daily    insulin glargine  15 Units Subcutaneous Nightly    sodium chloride flush  10 mL Intravenous 2 times per day     acetaminophen, heparin (porcine), heparin (porcine), naloxone, morphine, morphine, morphine, glucose, dextrose, glucagon (rDNA), dextrose, sodium chloride flush, polyethylene glycol, melatonin, ondansetron **OR** ondansetron  Diet NPO Effective Now         Lab and other Data:     Recent Labs     02/29/20 0138 03/01/20  0406 03/02/20  0413   WBC 14.2* 13.5* 16.3*   HGB 18.0 15.9 13.4*    179 142     Recent Labs     02/29/20 0138 03/01/20  0406 03/02/20  0413   *  --  137 130*   K 4.1   < > 3.4*  3.4* 3.0*  3.0*   *  --  99 96*   CO2 17*  --  20* 20*   BUN 42*  --  35* 32*   CREATININE 2.1*  --  1.7* 1.7*   GLUCOSE 353*  --  362* 246*    < > = values in this interval not displayed. Recent Labs     02/28/20 2321   AST 35   ALT 18   BILITOT 0.8   ALKPHOS 90     Troponin T:   Recent Labs     02/29/20  0138 02/29/20  0503 02/29/20  0800   TROPONINI 0.01 <0.01 <0.01     Pro-BNP: No results for input(s): BNP in the last 72 hours.   INR:   Recent Labs     02/28/20 2321   INR 1.53*     ABGs:   Lab Results   Component Value Date    PHART 7.520 02/29/2020    PO2ART 50.0 02/29/2020    GRY5SZJ 26.0 02/29/2020     UA:  Recent Labs     02/29/20  0400   COLORU DK YELLOW   PHUR 5.5   LABCAST 0-1 RBC*   RBCUA 3-5*   CLARITYU CLOUDY*   SPECGRAV 1.028 LEUKOCYTESUR Negative   UROBILINOGEN 1.0   BILIRUBINUR SMALL*   BLOODU LARGE*   GLUCOSEU 500*       Imaging:   XR CHEST PORTABLE   Final Result   1. Bilateral lower lung pneumonia, much greater on the left. Signed by Dr Marlin Hebert on 2/29/2020 8:01 PM      US RENAL COMPLETE   Final Result   1. The renal size, cortical thickness and echogenicity are within   normal limits. No hydronephrosis. 2. Probable small septated cyst in the lower pole right kidney. Given   the septation, consider follow-up in 6 months. 3. Prostate is enlarged. Signed by Dr Cade Seo on 2/29/2020 11:44 AM        Micro:   Blood Culture, Routine   Date Value Ref Range Status   02/29/2020 (A)  Preliminary    Gram stain Aerobic bottle:  Gram positive cocci in clusters  resembling Staphylococcus  Culture in progress  Please notify Physician   Bottle volume = <5 ml  Quality of results might be  affected with low volume. Gram stain Anaerobic bottle:  Gram positive cocci in clusters  resembling Staphylococcus  Culture in progress  Please notify Physician   Bottle volume = <5 ml  Quality of results might be  affected with low volume. 02/29/2020 (A)  Preliminary    CONTACT PRECAUTIONS INDICATED  Refer to Blood Culture collected 2/29/20 at 0138 for sensitivity results  Isolated from Aerobic and Anaerobic bottle     , No components found for: LABURINE  Patient Active Problem List    Diagnosis Date Noted    Severe malnutrition ([address] 02/29/2020    Left lower lobe consolidation ([address] 02/28/2020    Nonrheumatic aortic valve insufficiency 03/12/2018    Type 2 diabetes mellitus without complication ([address] 51/35/9343    Coronary artery disease involving native heart without angina pectoris 11/05/2015    Essential hypertension 11/05/2015    Pure hypercholesterolemia 11/05/2015       Assessment/plan:    Active Problems:    Left lower lobe consolidation (HCC)    Severe malnutrition ([address]  Resolved Problems:    * No resolved hospital

## 2020-03-02 NOTE — PROGRESS NOTES
Assessment completed. Patient denies c/o pain. Family at bedside and updated on status and plan for today.

## 2020-03-02 NOTE — CARE COORDINATION
information given. Armando Cuevas is agreeable with appropriate follow up after discharge. Patient is a transfer from [facility] from [facility]. He is currently in CCU. Wife states he resides at [facility]. He is dependent for ADL's, meals, medications, transportation, and finances. He has hx of Alzheimer's. Will follow as inpatient and upon discharge as indicated. No future appointments. Health Maintenance  There are no preventive care reminders to display for this patient.     Thee Plasencia RN

## 2020-03-02 NOTE — PROGRESS NOTES
Physical Therapy    Eval attempted. Nurse asks to hold on mobility. Will cont to follow.     Electronically signed by Marsha Wadsworth PT on 3/2/2020 at 8:56 AM

## 2020-03-02 NOTE — CONSULTS
Palliative Care:  Pt transferred from OS. He is currently a NH pt and plans to return to a  per family. Pt nurse asked if palliative would see family/pt. While there family asked to discuss hospice care and what it would involve. Past Medical History:        Past Medical History:   Diagnosis Date    CAD (coronary artery disease)     [facility] obstructive [facility] disease) [facility]     Hyperlipidemia     Hypertension     Palliative care patient 03/02/2020    Type II or unspecified type diabetes mellitus without mention of complication, not stated as uncontrolled        Advance Directives:  Pt spouse requested pt be a DNR. Order was changed in ED per admitting MD.       Pain/Other Symptoms: At present time pt appears comfortable. He is confused and does not participate in conversation. Activity:   Up with assist            Psychological/Spiritual:  Spouse states they have [facility] home and good spiritual support. Family gathered and supportive to pt and spouse. Plan:   Continue Amiodarone and Cardizem gtt, IV abx,  Consults    Patient/family discussion r/t goals:  Spoke with pt spouse and children. They tell me pt has had a significant decline over the past 2 months. He has had freq falls, lack of appetitie and know he has been aspirating(made aware as of this admission). Spouse is asking about hospice services and what that would involve. Spoke with them about what hospice can provide and also spoke with them about hospice vs killed care and R&B responsibility. Unsure if pt would qualify for medicaid assistance. Taking pt home to care for him there is not an option at this point. Spouse tells me she has had surgery and unable to care for pt. Family would like to talk to talk with SW about finance part of qualifying. Message left for Lorena Suarez. Family is not interested in pursuing FT should pt be unable to eat/swallow. S/L has done an  eval today.   Palliative

## 2020-03-02 NOTE — PROGRESS NOTES
Nephrology ([facility]) Progress Note    Patient:  Raina Jhonson  YOB: 1935  Date of Service: 3/2/2020  MRN: [id number]   Acct: [de-identified]   Primary Care Physician: J LUIS Slater  Advance Directive: DNR-CCA  Admit Date: 2/28/2020       Hospital Day: 3  Referring Provider: Freeman Esteves MD    Patient independently seen and examined, Chart, Consults, Notes, Operative notes, Labs, Cardiology, and Radiology studies reviewed as able. Subjective:  Raina Johnson is a 82 y.o. male  whom we were consulted for Hypernatremia. He was transferred [facility] where he was managed for pneumonia. He was recently admitted to a nursing facility for failure to thrive and worsening dementia.  His oral intake and his weight have decreased. He was also diagnosed with influenza infection and was started on Tamiflu. Patient was in CCU at [facility]. He was awake but tired. His renal function had been abnormal but he was not previously under the care of nephrology. His creatinine was 1.4 (baseline) and it had risen to 2.3 on admission. His serum sodium was 150 as well. Renal service was consulted to manage his SONIA, CKD stage 3 and hypernatremia. Today, no overnight events per nursing. He remains confused and unable to fully participate history and physical examination. Has not yet been able to begin a diet due to speech evaluations.     Allergies:  Beta adrenergic blockers    Medicines:  Current Facility-Administered Medications   Medication Dose Route Frequency Provider Last Rate Last Dose    digoxin (LANOXIN) injection 125 mcg  125 mcg Intravenous Daily Minus MD Nelson   125 mcg at 03/02/20 1035    acetaminophen (TYLENOL) suppository 650 mg  650 mg Rectal Q4H PRN Freeman Esteves MD   650 mg at 03/01/20 1654    oseltamivir 6mg/ml (TAMIFLU) suspension 30 mg  30 mg Oral Daily Minus MD Nelson        sodium bicarbonate 100 mEq in dextrose 5 % 1,000 mg/min Intravenous Continuous Mariela Thompson MD 16.7 mL/hr at 03/02/20 0141 0.5 mg/min at 03/02/20 0141    aspirin EC tablet 81 mg  81 mg Oral Daily Mariela Thompson MD        atorvastatin (LIPITOR) tablet 40 mg  40 mg Oral Nightly Mariela Thompson MD        clopidogrel (PLAVIX) tablet 75 mg  75 mg Oral Daily Mariela Thompson MD        therapeutic multivitamin-minerals 1 tablet  1 tablet Oral Daily Mariela Thompson MD        pantoprazole (PROTONIX) tablet 40 mg  40 mg Oral QAM AC Mariela Thompson MD        vitamin B-12 (CYANOCOBALAMIN) tablet 500 mcg  500 mcg Oral Daily Mariela Thompson MD        insulin glargine (LANTUS) injection vial 15 Units  15 Units Subcutaneous Nightly Mariela Thompson MD   15 Units at 03/01/20 2035    glucose (GLUTOSE) 40 % oral gel 15 g  15 g Oral PRN Mariela Thompson MD        dextrose 50 % IV solution  12.5 g Intravenous PRN Mariela Thompson MD        glucagon (rDNA) injection 1 mg  1 mg Intramuscular PRN Mariela Thompson MD        dextrose 5 % solution  100 mL/hr Intravenous PRN Mariela Thompson MD        sodium chloride flush 0.9 % injection 10 mL  10 mL Intravenous 2 times per day Mariela Thompson MD   10 mL at 03/01/20 2036    sodium chloride flush 0.9 % injection 10 mL  10 mL Intravenous PRN Mariela Thompson MD        polyethylene glycol [facility]) packet 17 g  17 g Oral Daily PRN Mariela Thompson MD        melatonin tablet 3 mg  3 mg Oral Nightly PRN Mariela Thompson MD        ondansetron (ZOFRAN-ODT) disintegrating tablet 4 mg  4 mg Oral Q8H PRN Mariela Thompson MD        Or    ondansetron [facility]) injection 4 mg  4 mg Intravenous Q6H PRN Mariela Thompson MD           Past Medical History:  Past Medical History:   Diagnosis Date    CAD (coronary artery disease)     COPD (chronic obstructive [facility] disease) [facility]     Hyperlipidemia     Hypertension     Palliative care patient 03/02/2020    Type II or unspecified type diabetes mellitus without mention of complication, not stated as uncontrolled        Past Surgical History:  Past Surgical History:   Procedure Laterality Date    CATARACT REMOVAL      CORONARY ARTERY BYPASS GRAFT  2010    3V by Dr. Nitish Lord at [address]      from face   [facility]      TYMPANOSTOMY TUBE PLACEMENT         Family History  Family History   Problem Relation Age of Onset    Diabetes Father     Heart Disease Father     Cancer Brother        Social History  Social History     Socioeconomic History    Marital status:      Spouse name: Not on file    Number of children: Not on file    Years of education: Not on file    Highest education level: Not on file   Occupational History    Not on file   Social Needs    Financial resource strain: Not on file    Food insecurity:     Worry: Not on file     Inability: Not on file    Transportation needs:     Medical: Not on file     Non-medical: Not on file   Tobacco Use    Smoking status: Former Smoker    Smokeless tobacco: Never Used   Substance and Sexual Activity    Alcohol use: No    Drug use: Not on file    Sexual activity: Not Currently   Lifestyle    Physical activity:     Days per week: Not on file     Minutes per session: Not on file    Stress: Not on file   Relationships    Social connections:     Talks on phone: Not on file     Gets together: Not on file     Attends [employer] service: Not on file     Active member of club or organization: Not on file     Attends meetings of clubs or organizations: Not on file     Relationship status: Not on file    Intimate partner violence:     Fear of current or ex partner: Not on file     Emotionally abused: Not on file     Physically abused: Not on file     Forced sexual activity: Not on file   Other Topics Concern    Not on file   Social History Narrative    Not on file       Review of Systems:  History obtained from unobtainable from patient due to mental status            Objective:  Patient Vitals --   --  20* 20*   PHOS 2.6  --   --  2.7  --   --   --    BUN 41*  --  42*  --   --  35* 32*   CREATININE 2.3*  --  2.1*  --   --  1.7* 1.7*   CALCIUM 8.9  --  8.9  --   --  7.8* 7.3*    < > = values in this interval not displayed. CBC:   Recent Labs     02/29/20  0138 03/01/20  0406 03/02/20  0413   WBC 14.2* 13.5* 16.3*   HGB 18.0 15.9 13.4*   HCT 55.5* 48.4 39.6*   .3* 100.4* 96.8*    179 142     LIVER PROFILE:   Recent Labs     02/28/20  2321   AST 35   ALT 18   BILITOT 0.8   ALKPHOS 90     PT/INR:   Recent Labs     02/28/20  2321   PROTIME 18.5*   INR 1.53*     APTT:   Recent Labs     03/01/20  2219 03/02/20  0413 03/02/20  1059   APTT 60.1* 58.5* 49.2*     BNP:  No results for input(s): BNP in the last 72 hours. Ionized Calcium:No results for input(s): IONCA in the last 72 hours. Magnesium:  Recent Labs     02/29/20  0503 03/01/20  0406 03/02/20  0413   MG 2.4 1.7 1.5*     Phosphorus:  Recent Labs     02/28/20  2321 02/29/20  0503   PHOS 2.6 2.7     HgbA1C:   Recent Labs     02/28/20  2321   LABA1C 6.9*     Hepatic:   Recent Labs     02/28/20  2321   ALKPHOS 90   ALT 18   AST 35   PROT 6.3*   BILITOT 0.8   LABALBU 2.8*     Lactic Acid:   Recent Labs     02/29/20  0503   LACTA 3.5*     Troponin:   Recent Labs     03/01/20  0406   CKTOTAL 388*     ABGs:   Lab Results   Component Value Date    PHART 7.520 02/29/2020    PO2ART 50.0 02/29/2020    UHK4RPM 26.0 02/29/2020     CRP:  No results for input(s): CRP in the last 72 hours. Sed Rate:  No results for input(s): SEDRATE in the last 72 hours. Culture Results:   Blood Culture Recent:   Recent Labs     02/29/20  0137   BC Gram stain Aerobic bottle:  Gram positive cocci in clusters  resembling Staphylococcus  Culture in progress  Please notify Physician   Bottle volume = <5 ml  Quality of results might be  affected with low volume.   Gram stain Anaerobic bottle:  Gram positive cocci in clusters  resembling Staphylococcus  Culture in progress  Please notify Physician   Bottle volume = <5 ml  Quality of results might be  affected with low volume. *  CONTACT PRECAUTIONS INDICATED  Refer to Blood Culture collected 2/29/20 at 0138 for sensitivity results  Isolated from Aerobic and Anaerobic bottle  *       Urine Culture Recent :   Recent Labs     02/11/20  1618   LABURIN No growth at 36-48 hours       Radiology reports as per the Radiologist  Radiology: Us Renal Complete    Result Date: 2/29/2020  EXAMINATION:  US RENAL COMPLETE  2/29/2020 11:42 AM HISTORY: Acute renal insufficiency. COMPARISON: No comparison study. TECHNIQUE: Grayscale and color flow imaging was performed. FINDINGS: The right kidney measures 9.9 cm and the left kidney measures 9.5 cm. The cortical thickness and echogenicity are normal. There is a small septated cyst in the lower pole right kidney measuring up to 1.3 cm. There is no hydronephrosis. The prostate is prominent measuring 3.9 x 4.3 x 3.7 cm.    1. The renal size, cortical thickness and echogenicity are within normal limits. No hydronephrosis. 2. Probable small septated cyst in the lower pole right kidney. Given the septation, consider follow-up in 6 months. 3. Prostate is enlarged. Signed by Dr Chichi Sofia on 2/29/2020 11:44 AM    Xr Chest Portable    Result Date: 2/29/2020  XR CHEST PORTABLE 2/29/2020 5:15 PM HISTORY: Pneumonia COMPARISON: None. FINDINGS: Frontal view of the chest was obtained. Moderate to marked opacities are seen throughout the left midlung and lower lung. Mild opacities are also seen in the right lower lobe. Changes of previous CABG are noted. The osseous structures and surrounding soft tissues demonstrate no acute abnormality. 1. Bilateral lower lung pneumonia, much greater on the left.  Signed by Dr Ariana Wakefield on 2/29/2020 8:01 PM       Assessment   Hypernatremia-resolved  Hyponatremia  Acute renal failure  Chronic kidney disease stage III  Pneumonia with sepsis  Flu B positive  Atrial fibrillation with periods of rapid ventricular response  Metabolic acidosis  Secondary hyperparathyroidism    Plan:  Discussed with patient, nursing  Change IV fluids given resolved hypernatremia and current hyponatremia  Replace potassium magnesium IV as required n.p.o. by speech therapy  Continue speech evaluations  Antibiotics per primary service  Monitor labs    Saintclair Ammon, MD  03/02/20  11:45 AM

## 2020-03-02 NOTE — PROGRESS NOTES
Approached by family regarding information for hospice care. Dr. Cristo Pickens notified of request and consult obtained.

## 2020-03-03 NOTE — PROGRESS NOTES
Visited with pt to provide spiritual care. Pt says as far as he knows he is doing ok. Provided spiritual care with sustaining presence, nurtured hope, and prayer. Pt expressed gratitude for spiritual care.     Electronically signed by King Hollis on 3/3/2020 at 12:03 PM

## 2020-03-03 NOTE — PROGRESS NOTES
tamiflu,  Family decided DNR, BC 2/2 MRSA sepsis  on vanco Family interested in home hospice ? Tomorrow,   3/2/20  Admitted for LLL pneumonia, flu B , acute on CKd, lactic acidosis, hypernatremia and dehydration afib with RVR , placed on amiodaron gtt? Zosyn tamiflu,  Family decided DNR, BC 2/2 MRSA sepsis  on vanco  awaiting ID, CR down to 1.7, hypernatremia resolved, US renal no hydro, NPO, ST said he did better today will reeval in am, however family decided on hospice at home ? On Wednesday, will transfer to floor, D/C IV gtt     3/1/20  Family decided DNR, BC 2/2 G+ cocci, awaiting ID, CR down to 1.7, hypernatremia resolved, US renal no hydro, NPO, on cardizem gtt and amiodaron gtt and heparin , appreciate nephrology, cards , swallow eval in am if fails will consult  hospice   2/29/20  Admitted for LLL pneumonia, flu B , acute on CKd, lactic acidosis, hypernatremia and dehydration afib with RVR , placed on amiodaron gtt? Zosyn tamiflu   Objective:   VITALS:  BP (!) 152/79   Pulse 82   Temp 96.5 °F (35.8 °C) (Temporal)   Resp 16   Ht 6' (1.829 m)   Wt 133 lb 14.4 oz (60.7 kg)   SpO2 90%   BMI 18.16 kg/m²   24HR INTAKE/OUTPUT:      Intake/Output Summary (Last 24 hours) at 3/3/2020 0820  Last data filed at 3/3/2020 0404  Gross per 24 hour   Intake 520.45 ml   Output 2000 ml   Net -1479.55 ml      General appearance: lethargic   HEENT: atraumatic, eyes with clear conjunctiva and normal lids  Lungs:  increased work of breathing, diminished breath sounds bilaterally  Heart: S1, S2 normal  Abdomen: soft, non-tender; bowel sounds normal; no masses,  no organomegaly  Extremities:atraumatic, no cyanosis no edema no calf tenderness   Neurologic:lethargic   Skin: no rashes, nodules.           Medications:      dextrose        vancomycin  1,000 mg Intravenous Once    oseltamivir 6mg/ml  30 mg Oral Daily    metoprolol  2.5 mg Intravenous Q6H    furosemide  40 mg Intravenous Once    levalbuterol  0.63 mg Final Result   1. The renal size, cortical thickness and echogenicity are within   normal limits. No hydronephrosis. 2. Probable small septated cyst in the lower pole right kidney. Given   the septation, consider follow-up in 6 months. 3. Prostate is enlarged. Signed by Dr Babatunde Bird on 2/29/2020 11:44 AM        Micro:   Blood Culture, Routine   Date Value Ref Range Status   02/29/2020 (A)  Final     Bottle volume = <5 ml  Quality of results might be  affected with low volume. Bottle volume = <5 ml  Quality of results might be  affected with low volume. 02/29/2020 (A)  Final    CONTACT PRECAUTIONS INDICATED  Refer to Blood Culture collected 2/29/20 at 0138 for sensitivity results  Isolated from Aerobic and Anaerobic bottle     , No components found for: LABURINE  Patient Active Problem List    Diagnosis Date Noted    Palliative care patient 03/02/2020    Severe malnutrition ([address] 02/29/2020    Left lower lobe consolidation ([address] 02/28/2020    Nonrheumatic aortic valve insufficiency 03/12/2018    Type 2 diabetes mellitus without complication ([address] 93/52/2548    Coronary artery disease involving native heart without angina pectoris 11/05/2015    Essential hypertension 11/05/2015    Pure hypercholesterolemia 11/05/2015       Assessment/plan: Active Problems:    Left lower lobe consolidation (HCC)    Severe malnutrition ([address]    Palliative care patient  Resolved Problems:    * No resolved hospital problems.  *  Sepsis, present on admission, causative organism MRSA   Paroxysmal Atrial Fibrillation  Plan:     LLL HCAP versus Viral PNA:  Influenzae B Infection:  Acute Kidney Injury on CKD:  Sinus Tachy at 140s:  Admit to ICU - seems to have improved significantly from OSH, would continue in ICU for now as on Amiodarone GGT  Zosyn 3.375g IV Q8h  Tamiflu dosed renally at 30mg PO QDay  Pharmacy to dose meds as needed as per renal function  Amiodarone GGT started at OSH - will continue  Have held the Metoprolol, as small IV dose was given at OSH with rapid significant drop in both HR and BP  NaHCO3 solution at 100cc/h with 100meq per liter - will defer to nephro from AM  Nephro consult in AM  Cardio consult in AM  Normal Screening labs upon admission collected  Ur Na and Ur Cr and Ur OSM sent  Will defer on renal US  Droplet precautions  2/29/20  Admitted for LLL pneumonia, flu B , acute on CKd, lactic acidosis, hypernatremia and dehydration afib with RVR , placed on amiodaron gtt? Zosyn tamiflu   3/1/20  Family decided DNR, BC 2/2 G+ cocci, awaiting ID, CR down to 1.7, hypernatremia resolved, US renal no hydro, NPO, on cardizem gtt and amiodaron gtt and heparin , appreciate nephrology, cards , swallow eval in am if fails will consult  hospice   3/2/20  Admitted for LLL pneumonia, flu B , acute on CKd, lactic acidosis, hypernatremia and dehydration afib with RVR , placed on amiodaron gtt?  Zosyn tamiflu,  Family decided DNR, BC 2/2 MRSA sepsis  on vanco  awaiting ID, CR down to 1.7, hypernatremia resolved, US renal no hydro, NPO, ST said he did better today will reeval in am   Agapito Lr MD  Hospitalist Service  3/3/2020  8:20 AM

## 2020-03-03 NOTE — PROGRESS NOTES
Pharmacy Vancomycin Consult     Vancomycin Day: 3  Current Dosing: pulse dosing     Temp max:  98.7    Recent Labs     03/01/20  0406 03/02/20  0413   BUN 35* 32*       Recent Labs     03/01/20  0406 03/02/20  0413   CREATININE 1.7* 1.7*       Recent Labs     03/01/20  0406 03/02/20  0413   WBC 13.5* 16.3*         Intake/Output Summary (Last 24 hours) at 3/3/2020 0720  Last data filed at 3/3/2020 0404  Gross per 24 hour   Intake 930.22 ml   Output 2000 ml   Net -1069.78 ml       Culture Date Source Results   02/29/20 Blood x 2  Staph aureus MRSA                 Ht Readings from Last 1 Encounters:   02/28/20 6' (1.829 m)        Wt Readings from Last 1 Encounters:   02/28/20 133 lb 14.4 oz (60.7 kg)         Body mass index is 18.16 kg/m². Estimated Creatinine Clearance: 29 mL/min (A) (based on SCr of 1.7 mg/dL (H)). Random: 8.7    Assessment/Plan: Continue Vancomycin pulse dosing due to elevated serum creatinine. Give Vancomycin 1000 mg IV x 1 dose today. Level ordered for 03/04 at 0800.     Electronically signed by Lisa Almodovar [facility] on 3/3/2020 at 7:20 AM

## 2020-03-03 NOTE — PROGRESS NOTES
1-2 seconds in length. Oral transit of puree consistency trials, presented by SLP, primarily measured 1-2 seconds in length and no oral cavity residue was observed post swallows. Oral transit of nectar thick liquid trials, presented via cup by SLP, primarily measured 1-2 seconds in length. It is noted that patient voiced refusal of regular solid consistency trials and thin H2O trials; patient swatted when attempting to present further trials.       Indicators of Pharyngeal Phase Dysfunction  Pharyngeal Phase: Exceptions  Indicators of Pharyngeal Phase Dysfunction  Decreased Laryngeal Elevation: (Patient exhibited sluggish, mild-moderately decreased laryngeal elevation for swallow airway protection.)  Pharyngeal: No outward S/S penetration/aspiration was noted with any ice chip trial, puree consistency trial, or nectar thick liquid trial administered during treatment session this date. Was unable to observe swallow function with any additional consistency secondary to patient refusal of further consistency trials. At this time, would trial full liquid diet with nectar thick liquids. TOTAL FEED. Recommend meds crushed in pudding/applesauce.      Electronically signed by OSVALDO Oneil on 3/3/2020 at 10:06 AM

## 2020-03-03 NOTE — PROGRESS NOTES
Report called to fourth floor. Patient incontinent of stool. Pericare and linen change completed. Patient tolerated well. Patient transferred to new room. Family updated.

## 2020-03-03 NOTE — PLAN OF CARE
Problem: Falls - Risk of:  Goal: Will remain free from falls  Description  Will remain free from falls  Outcome: Ongoing  Goal: Absence of physical injury  Description  Absence of physical injury  Outcome: Ongoing     Problem: Risk for Impaired Skin Integrity  Goal: Tissue integrity - skin and mucous membranes  Description  Structural intactness and normal physiological function of skin and  mucous membranes.   Outcome: Ongoing     Problem: Discharge Planning:  Goal: Participates in care planning  Description  Participates in care planning  Outcome: Ongoing  Goal: Discharged to appropriate level of care  Description  Discharged to appropriate level of care  Outcome: Ongoing  Goal: Ability to perform activities of daily living will improve  Description  Ability to perform activities of daily living will improve  Outcome: Ongoing     Problem: Aspiration:  Goal: Absence of aspiration  Description  Absence of aspiration  Outcome: Ongoing     Problem: Gas Exchange - Impaired:  Goal: Levels of oxygenation will improve  Description  Levels of oxygenation will improve  Outcome: Ongoing     Problem: Mental Status - Impaired:  Goal: Mental status will be restored to baseline  Description  Mental status will be restored to baseline  Outcome: Ongoing     Problem: Nutrition  Goal: Optimal nutrition therapy  Description  Nutrition Problem: Severe malnutrition  Intervention: Food and/or Nutrient Delivery: Continue NPO  Nutritional Goals: Pt will progress to an oral diet to meet nutritional needs or needs will be met via RED     3/3/2020 0214 by Saida Quintero RN  Outcome: Ongoing  3/2/2020 1357 by Julia Sadler, MS, RD, LD  Outcome: Ongoing     Problem: Confusion - Acute:  Goal: Mental status will be restored to baseline  Description  Mental status will be restored to baseline  Outcome: Ongoing  Goal: Absence of continued neurological deterioration signs and symptoms  Description  Absence of continued neurological deterioration signs and symptoms  Outcome: Ongoing     Problem: Injury - Risk of, Physical Injury:  Goal: Will remain free from falls  Description  Will remain free from falls  Outcome: Ongoing  Goal: Absence of physical injury  Description  Absence of physical injury  Outcome: Ongoing     Problem: Mood - Altered:  Goal: Mood stable  Description  Mood stable  Outcome: Ongoing  Goal: Absence of abusive behavior  Description  Absence of abusive behavior  Outcome: Ongoing  Goal: Verbalizations of feeling emotionally comfortable while being cared for will increase  Description  Verbalizations of feeling emotionally comfortable while being cared for will increase  Outcome: Ongoing     Problem: Psychomotor Activity - Altered:  Goal: Absence of psychomotor disturbance signs and symptoms  Description  Absence of psychomotor disturbance signs and symptoms  Outcome: Ongoing     Problem: Sensory Perception - Impaired:  Goal: Demonstrations of improved sensory functioning will increase  Description  Demonstrations of improved sensory functioning will increase  Outcome: Ongoing  Goal: Decrease in sensory misperception frequency  Description  Decrease in sensory misperception frequency  Outcome: Ongoing  Goal: Able to refrain from responding to false sensory perceptions  Description  Able to refrain from responding to false sensory perceptions  Outcome: Ongoing  Goal: Demonstrates accurate environmental perceptions  Description  Demonstrates accurate environmental perceptions  Outcome: Ongoing  Goal: Able to distinguish between reality-based and nonreality-based thinking  Description  Able to distinguish between reality-based and nonreality-based thinking  Outcome: Ongoing  Goal: Able to interrupt nonreality-based thinking  Description  Able to interrupt nonreality-based thinking  Outcome: Ongoing     Problem: Sleep Pattern Disturbance:  Goal: Appears well-rested  Description  Appears well-rested  Outcome: Ongoing

## 2020-03-03 NOTE — PROGRESS NOTES
Physical Therapy    Facility/Department: [facility] ONCOLOGY UNIT  Initial Assessment    NAME: Dima Nolasco  : 1935  MRN: [id number]    Date of Service: 3/3/2020    Discharge Recommendations:  Continue to assess pending progress, 24 hour supervision or assist        Assessment   Body structures, Functions, Activity limitations: Decreased functional mobility ; Decreased balance;Decreased cognition;Decreased safe awareness;Decreased strength;Decreased posture  Assessment: Pt is a 83 y/o male who [facility] with LLL PNA, influenza B, acute on CKD, dehydration and end-stage dementia who requires mod-max A for mobility. He is limited by cognitive deficits. Treatment Diagnosis: Impaired mobility  Prognosis: Fair  Decision Making: Medium Complexity  PT Education: Goals;Precautions; Functional Mobility Training;PT Role;Plan of Care;General Safety  Barriers to Learning: cognitive  REQUIRES PT FOLLOW UP: Yes  Activity Tolerance  Activity Tolerance: Patient limited by cognitive status; Patient limited by pain;Treatment limited secondary to medical complications (free text)       Patient Diagnosis(es): There were no encounter diagnoses. has a past medical history of CAD (coronary artery disease), COPD (chronic obstructive pulmonary disease) ([facility] [address] [facility] Hyperlipidemia, Hypertension, Palliative care patient, and Type II or unspecified type diabetes mellitus without mention of complication, not stated as uncontrolled. has a past surgical history that includes Kidney stone surgery; hernia repair; Coronary artery bypass graft ();  Cataract removal; Tympanostomy tube placement; and cyst removal.    Restrictions  Restrictions/Precautions  Restrictions/Precautions: Fall Risk, Contact Precautions(droplet precautions)  Vision/Hearing        Subjective  General  Chart Reviewed: Yes  Patient assessed for rehabilitation services?: Yes  Family / Caregiver Present: No  Follows Commands: Impaired  Subjective  Subjective: Pt

## 2020-03-03 NOTE — PROGRESS NOTES
Tamara Arias MD   2.5 mg at 03/03/20 1149    furosemide (LASIX) injection 40 mg  40 mg Intravenous Once Al Jones MD   Stopped at 02/29/20 1514    heparin (porcine) injection 3,640 Units  60 Units/kg Intravenous PRN Al Jones MD        heparin (porcine) injection 1,820 Units  30 Units/kg Intravenous PRN Al Jones MD        levalbuterol Sharlyne Diego) nebulization 0.63 mg  0.63 mg Nebulization Q4H Wojciech Marcos MD   0.63 mg at 03/03/20 1051    naloxone [facility]) injection 0.4 mg  0.4 mg Intravenous PRN Caleb Molina MD        morphine injection 0.52 mg  0.52 mg Intravenous Q4H PRN Caleb Molina MD        morphine injection 2 mg  2 mg Intravenous Q4H PRN Caleb Molina MD        morphine injection 1 mg  1 mg Intravenous Q4H PRN Caleb Molina MD        vancomycin Hector Baugh) intermittent dosing (placeholder)   Other RX Placeholder Caleb Molina MD        piperacillin-tazobactam (ZOSYN) 3.375 g in dextrose 5 % 50 mL IVPB extended infusion (mini-bag)  3.375 g Intravenous Q8H Caleb Molina MD   Stopped at 03/03/20 0956    aspirin EC tablet 81 mg  81 mg Oral Daily Caleb Molina MD        atorvastatin (LIPITOR) tablet 40 mg  40 mg Oral Nightly Caleb Molina MD        clopidogrel (PLAVIX) tablet 75 mg  75 mg Oral Daily Caleb Molina MD        therapeutic multivitamin-minerals 1 tablet  1 tablet Oral Daily Caleb Molina MD        pantoprazole (PROTONIX) tablet 40 mg  40 mg Oral QAM AC Caleb Molina MD        vitamin B-12 (CYANOCOBALAMIN) tablet 500 mcg  500 mcg Oral Daily Caleb Molina MD        insulin glargine (LANTUS) injection vial 15 Units  15 Units Subcutaneous Nightly Caleb Molian MD   15 Units at 03/01/20 2035    glucose (GLUTOSE) 40 % oral gel 15 g  15 g Oral PRN Caleb Molina MD        dextrose 50 % IV solution  12.5 g Intravenous PRN Caleb Molina MD        glucagon (rDNA) injection 1 mg  1 mg Intramuscular PRN Caleb Molina MD        dextrose 5 % solution  100 mL/hr Intravenous PRN Amita Irizarry MD        sodium chloride flush 0.9 % injection 10 mL  10 mL Intravenous 2 times per day Amita Irizarry MD   10 mL at 03/02/20 2330    sodium chloride flush 0.9 % injection 10 mL  10 mL Intravenous PRN Amita Irizarry MD        polyethylene glycol [facility]) packet 17 g  17 g Oral Daily PRN Amita Irizarry MD        melatonin tablet 3 mg  3 mg Oral Nightly PRN Amita Irizarry MD        ondansetron (ZOFRAN-ODT) disintegrating tablet 4 mg  4 mg Oral Q8H PRN Amita Irizarry MD        Or    ondansetron [facility]) injection 4 mg  4 mg Intravenous Q6H PRN Amita Irizarry MD           Past Medical History:  Past Medical History:   Diagnosis Date    CAD (coronary artery disease)     [facility] (chronic obstructive [facility] disease) [facility] [address]     Hyperlipidemia     Hypertension     Palliative care patient 03/02/2020    Type II or unspecified type diabetes mellitus without mention of complication, not stated as uncontrolled        Past Surgical History:  Past Surgical History:   Procedure Laterality Date    CATARACT REMOVAL      CORONARY ARTERY BYPASS GRAFT  2010    3V by Dr. Bill Bennett at [address] [place] [address],[address]      from face   [address] [facility]      TYMPANOSTOMY TUBE PLACEMENT         Family History  Family History   Problem Relation Age of Onset    Diabetes Father     Heart Disease Father     Cancer Brother        Social History  Social History     Socioeconomic History    Marital status:      Spouse name: Not on file    Number of children: Not on file    Years of education: Not on file    Highest education level: Not on file   Occupational History    Not on file   Social Needs    Financial resource strain: Not on file    Food insecurity:     Worry: Not on file     Inability: Not on file    Transportation needs:     Medical: Not on file     Non-medical: Not on file   Tobacco Use    Smoking status: Former Smoker    Smokeless tobacco: Never Used   Substance and Sexual Activity    Alcohol use: No    Drug use: Not on file    Sexual activity: Not Currently   Lifestyle    Physical activity:     Days per week: Not on file     Minutes per session: Not on file    Stress: Not on file   Relationships    Social connections:     Talks on phone: Not on file     Gets together: Not on file     Attends L[employer] day service: Not on file     Active member of club or organization: Not on file     Attends meetings of clubs or organizations: Not on file     Relationship status: Not on file    Intimate partner violence:     Fear of current or ex partner: Not on file     Emotionally abused: Not on file     Physically abused: Not on file     Forced sexual activity: Not on file   Other Topics Concern    Not on file   Social History Narrative    Not on file       Review of Systems:  History obtained from unobtainable from patient due to mental status            Objective:  Patient Vitals for the past 24 hrs:   BP Temp Temp src Pulse Resp SpO2   03/03/20 1001 (!) 147/78 96.4 °F (35.8 °C) Temporal 79 16 90 %   03/03/20 0641 (!) 152/79 96.5 °F (35.8 °C) Temporal 82 16 90 %   03/03/20 0640 -- -- -- -- 18 91 %   03/03/20 0415 128/70 98 °F (36.7 °C) -- 90 18 90 %   03/02/20 1813 135/67 98.3 °F (36.8 °C) Oral 72 20 91 %   03/02/20 1700 (!) 124/59 -- -- 79 24 95 %   03/02/20 1600 (!) 123/47 -- -- 79 20 97 %   03/02/20 1500 (!) 121/56 -- -- 81 20 97 %   03/02/20 1400 (!) 122/51 -- -- 76 18 96 %   03/02/20 1300 (!) 113/44 -- -- 78 20 96 %   03/02/20 1200 (!) 111/52 -- -- 79 23 94 %       Intake/Output Summary (Last 24 hours) at 3/3/2020 1157  Last data filed at 3/3/2020 0404  Gross per 24 hour   Intake 520.45 ml   Output 1625 ml   Net -1104.55 ml     General: Lethargic and ill-appearing  Chest:  clear to auscultation bilaterally without respiratory distress  CVS: regular rate and rhythm  Abdominal: soft, nontender, normal bowel sounds  Extremities: no cyanosis or edema  Skin: warm and dry without rash    Labs:  BMP:   Recent Labs     03/01/20  0406 03/02/20  0413 03/02/20  1238    130*  --    K 3.4*  3.4* 3.0*  3.0* 3.4*   CL 99 96*  --    CO2 20* 20*  --    BUN 35* 32*  --    CREATININE 1.7* 1.7*  --    CALCIUM 7.8* 7.3*  --      CBC:   Recent Labs     03/01/20  0406 03/02/20  0413   WBC 13.5* 16.3*   HGB 15.9 13.4*   HCT 48.4 39.6*   .4* 96.8*    142     LIVER PROFILE:   No results for input(s): AST, ALT, LIPASE, BILIDIR, BILITOT, ALKPHOS in the last 72 hours. Invalid input(s): AMYLASE,  ALB  PT/INR:   No results for input(s): PROTIME, INR in the last 72 hours. APTT:   Recent Labs     03/01/20  2219 03/02/20 0413 03/02/20  1059   APTT 60.1* 58.5* 49.2*     BNP:  No results for input(s): BNP in the last 72 hours. Ionized Calcium:No results for input(s): IONCA in the last 72 hours. Magnesium:  Recent Labs     03/01/20  0406 03/02/20  0413 03/02/20  1238   MG 1.7 1.5* 2.0     Phosphorus:  No results for input(s): PHOS in the last 72 hours. HgbA1C:   No results for input(s): LABA1C in the last 72 hours. Hepatic:   No results for input(s): ALKPHOS, ALT, AST, PROT, BILITOT, BILIDIR, LABALBU in the last 72 hours. Lactic Acid:   No results for input(s): LACTA in the last 72 hours. Troponin:   Recent Labs     03/01/20 0406   CKTOTAL 388*     ABGs:   Lab Results   Component Value Date    PHART 7.520 02/29/2020    PO2ART 50.0 02/29/2020    ROO2ZGL 26.0 02/29/2020     CRP:  No results for input(s): CRP in the last 72 hours. Sed Rate:  No results for input(s): SEDRATE in the last 72 hours. Culture Results:   Blood Culture Recent:   Recent Labs     02/29/20  0137   BC  Bottle volume = <5 ml  Quality of results might be  affected with low volume. Bottle volume = <5 ml  Quality of results might be  affected with low volume.   *  CONTACT PRECAUTIONS INDICATED  Refer to Blood Culture collected 2/29/20 at 0138 for sensitivity results  Isolated from Aerobic and Anaerobic bottle  * hospice  Continue comfort care measures    Nette Garcia MD  03/03/20  11:57 AM

## 2020-03-04 PROBLEM — N17.9 ACUTE KIDNEY INJURY SUPERIMPOSED ON CHRONIC KIDNEY DISEASE (HCC): Status: ACTIVE | Noted: 2020-01-01

## 2020-03-04 PROBLEM — E87.1 HYPONATREMIA: Status: ACTIVE | Noted: 2020-01-01

## 2020-03-04 PROBLEM — N18.9 ACUTE KIDNEY INJURY SUPERIMPOSED ON CHRONIC KIDNEY DISEASE (HCC): Status: ACTIVE | Noted: 2020-01-01

## 2020-03-04 PROBLEM — N18.30 STAGE 3 CHRONIC KIDNEY DISEASE (HCC): Status: ACTIVE | Noted: 2020-01-01

## 2020-03-04 PROBLEM — J18.9 PNEUMONIA OF LEFT LOWER LOBE DUE TO INFECTIOUS ORGANISM: Status: ACTIVE | Noted: 2020-01-01

## 2020-03-04 PROBLEM — E87.0 HYPERNATREMIA: Status: ACTIVE | Noted: 2020-01-01

## 2020-03-04 PROBLEM — E87.0 HYPERNATREMIA: Status: RESOLVED | Noted: 2020-01-01 | Resolved: 2020-01-01

## 2020-03-04 PROBLEM — J10.1 INFLUENZA B: Status: ACTIVE | Noted: 2020-01-01

## 2020-03-04 PROBLEM — E87.6 HYPOKALEMIA: Status: ACTIVE | Noted: 2020-01-01

## 2020-03-04 PROBLEM — I48.0 PAROXYSMAL ATRIAL FIBRILLATION WITH RAPID VENTRICULAR RESPONSE (HCC): Status: ACTIVE | Noted: 2020-01-01

## 2020-03-04 NOTE — PROGRESS NOTES
Pharmacy Vancomycin Consult     Vancomycin Day: 4  Current Dosing: Pulse dosing    Temp max:  98    Recent Labs     03/02/20  0413   BUN 32*       Recent Labs     03/02/20  0413   CREATININE 1.7*       Recent Labs     03/02/20  0413   WBC 16.3*         Intake/Output Summary (Last 24 hours) at 3/4/2020 1051  Last data filed at 3/4/2020 0357  Gross per 24 hour   Intake --   Output 1325 ml   Net -1325 ml       Culture Date Source Results   02/29/20 Blood MRSA                 Ht Readings from Last 1 Encounters:   02/28/20 6' (1.829 m)        Wt Readings from Last 1 Encounters:   02/28/20 133 lb 14.4 oz (60.7 kg)         Body mass index is 18.16 kg/m². Estimated Creatinine Clearance: 29 mL/min (A) (based on SCr of 1.7 mg/dL (H)). Random 18 hrs post dose: 10.8    Assessment/Plan: Family has decided on home hospice and discharge summary does not continue Vancomycin. Dr. Loraine Lvoelace authorized giving another dose prior to discharge if timing works. Will send up a Vancomycin 1 gm IV x 1 dose for administration if timing works.     Electronically signed by JAYNA Hay [facility] on 3/4/2020 at 10:51 AM

## 2020-03-04 NOTE — PROGRESS NOTES
The needed equipment has been set up in the home. It is ok to dc the pt when medically ready. Hospice will monitor for dc and will meet the pt at his home for adm to hospice.

## 2020-03-04 NOTE — DISCHARGE SUMMARY
Merilyn Koyanagi  :  1935  MRN:  [id number]    Admit date:  2020  Discharge date:      Admitting Physician:  Baltazar Little DO    Advance Directive: DNR-CCA    Consults: cardiology and nephrology    Primary Care Physician:  J LUIS Castanon    Discharge Diagnoses:  Principal Problem:    Influenza B  Active Problems:    Type 2 diabetes mellitus without complication ([address]    Coronary artery disease involving native heart without angina pectoris    Pneumonia of left lower lobe due to infectious organism ([address]    Severe malnutrition ([address]    Palliative care patient    Stage 3 chronic kidney disease ([address]    Acute kidney injury superimposed on chronic kidney disease ([address]    Hyponatremia    Hypokalemia    Paroxysmal atrial fibrillation with rapid ventricular response ([address]  Resolved Problems:    Hypernatremia      Significant Diagnostic Studies:   Us Renal Complete    Result Date: 2020  EXAMINATION:  US RENAL COMPLETE  2020 11:42 AM HISTORY: Acute renal insufficiency. COMPARISON: No comparison study. TECHNIQUE: Grayscale and color flow imaging was performed. FINDINGS: The right kidney measures 9.9 cm and the left kidney measures 9.5 cm. The cortical thickness and echogenicity are normal. There is a small septated cyst in the lower pole right kidney measuring up to 1.3 cm. There is no hydronephrosis. The prostate is prominent measuring 3.9 x 4.3 x 3.7 cm.    1. The renal size, cortical thickness and echogenicity are within normal limits. No hydronephrosis. 2. Probable small septated cyst in the lower pole right kidney. Given the septation, consider follow-up in 6 months. 3. Prostate is enlarged. Signed by Dr Chichi Sofia on 2020 11:44 AM    Xr Chest Portable    Result Date: 2020  XR CHEST PORTABLE 2020 5:15 PM HISTORY: Pneumonia COMPARISON: None. FINDINGS: Frontal view of the chest was obtained. Moderate to marked opacities are seen throughout the left midlung and lower lung. hospice. Physical Exam:  Vitals: /70   Pulse 79   Temp 97.1 °F (36.2 °C) (Temporal)   Resp 18   Ht 6' (1.829 m)   Wt 133 lb 14.4 oz (60.7 kg)   SpO2 97%   BMI 18.16 kg/m²   24HR INTAKE/OUTPUT:      Intake/Output Summary (Last 24 hours) at 3/4/2020 9357  Last data filed at 3/4/2020 0357  Gross per 24 hour   Intake --   Output 1325 ml   Net -1325 ml     General appearance: alert and cooperative with exam  HEENT: atraumatic, eyes with clear conjunctiva and normal lids, pupils and irises normal, external ears and nose are normal, lips normal. Neck without masses, lympadenopathy, bruit, thyroid normal  Lungs: no increased work of breathing, diminished breath sounds bilaterally  Heart: regular rate and rhythm, S1, S2 normal, no murmur, click, rub or gallop  Abdomen: soft, non-tender; bowel sounds normal; no masses,  no organomegaly  Extremities: extremities normal without clubbing, atraumatic, no cyanosis or edema  Neurologic: no focal neurologic deficits, normal sensation, alert and oriented, affect and mood appropriate  Skin: no jaundice, rashes, or nodules    Discharge Medications:       Sindhu Tipton   Home Medication Instructions [id number]    Printed on:03/04/20 0839   Medication Information                      aspirin EC 81 MG EC tablet  Take 1 tablet by mouth daily             blood glucose test strips (TRUE METRIX BLOOD GLUCOSE TEST) strip  1 each by Does not apply route daily             clopidogrel (PLAVIX) 75 MG tablet  TAKE 1 TABLET BY MOUTH ONCE DAILY             levalbuterol (XOPENEX) 0.63 MG/3ML nebulization  Take 3 mLs by nebulization every 6 hours as needed for Wheezing or Shortness of Breath             melatonin 3 MG TABS tablet  Take 1 tablet by mouth every evening             metoprolol tartrate (LOPRESSOR) 50 MG tablet  Take 50 mg by mouth 2 times daily             Multiple Vitamins-Minerals (THERAPEUTIC MULTIVITAMIN-MINERALS) tablet  Take 1 tablet by mouth daily. ondansetron (ZOFRAN-ODT) 4 MG disintegrating tablet  Take 1 tablet by mouth every 8 hours as needed for Nausea or Vomiting             pantoprazole (PROTONIX) 40 MG tablet  Take 1 tablet by mouth every morning (before breakfast)             polyethylene glycol (GLYCOLAX) packet  Take 17 g by mouth daily as needed for Constipation or Other (No BM in 60 hours)             sitaGLIPtan-metformin (JANUMET)  MG per tablet  Take 1 tablet by mouth 2 times daily (with meals)             vitamin B-12 (CYANOCOBALAMIN) 500 MCG tablet  Take 500 mcg by mouth daily                 Discharge Instructions: Follow up with J LUIS Reed in 7 days. Take medications as directed. Resume activity as tolerated. Diet: DIET FULL LIQUID; Mildly Thick (Nectar)     Disposition: Patient is medically stable and will be discharged Home. Time spent on discharge greater than 30 minutes.     Signed:  Donna Mari DO

## 2020-03-04 NOTE — PROGRESS NOTES
Patient is refusing telemetry monitoring.  Electronically signed by Coral Zamarripa RN on 3/3/2020 at 7:55 PM

## 2020-03-04 NOTE — PLAN OF CARE
Problem: Falls - Risk of:  Goal: Will remain free from falls  Description  Will remain free from falls  Outcome: Ongoing  Goal: Absence of physical injury  Description  Absence of physical injury  Outcome: Ongoing     Problem: Risk for Impaired Skin Integrity  Goal: Tissue integrity - skin and mucous membranes  Description  Structural intactness and normal physiological function of skin and  mucous membranes.   Outcome: Ongoing     Problem: Discharge Planning:  Goal: Participates in care planning  Description  Participates in care planning  Outcome: Ongoing  Goal: Discharged to appropriate level of care  Description  Discharged to appropriate level of care  Outcome: Ongoing  Goal: Ability to perform activities of daily living will improve  Description  Ability to perform activities of daily living will improve  Outcome: Ongoing     Problem: Aspiration:  Goal: Absence of aspiration  Description  Absence of aspiration  Outcome: Ongoing     Problem: Gas Exchange - Impaired:  Goal: Levels of oxygenation will improve  Description  Levels of oxygenation will improve  Outcome: Ongoing     Problem: Mental Status - Impaired:  Goal: Mental status will be restored to baseline  Description  Mental status will be restored to baseline  Outcome: Ongoing     Problem: Nutrition  Goal: Optimal nutrition therapy  Description  Nutrition Problem: Severe malnutrition  Intervention: Food and/or Nutrient Delivery: Continue NPO  Nutritional Goals: Pt will progress to an oral diet to meet nutritional needs or needs will be met via RED     Outcome: Ongoing     Problem: Confusion - Acute:  Goal: Mental status will be restored to baseline  Description  Mental status will be restored to baseline  Outcome: Ongoing  Goal: Absence of continued neurological deterioration signs and symptoms  Description  Absence of continued neurological deterioration signs and symptoms  Outcome: Ongoing     Problem: Injury - Risk of, Physical Injury:  Goal: Will remain free from falls  Description  Will remain free from falls  Outcome: Ongoing  Goal: Absence of physical injury  Description  Absence of physical injury  Outcome: Ongoing     Problem: Mood - Altered:  Goal: Mood stable  Description  Mood stable  Outcome: Ongoing  Goal: Absence of abusive behavior  Description  Absence of abusive behavior  Outcome: Ongoing  Goal: Verbalizations of feeling emotionally comfortable while being cared for will increase  Description  Verbalizations of feeling emotionally comfortable while being cared for will increase  Outcome: Ongoing     Problem: Psychomotor Activity - Altered:  Goal: Absence of psychomotor disturbance signs and symptoms  Description  Absence of psychomotor disturbance signs and symptoms  Outcome: Ongoing     Problem: Sensory Perception - Impaired:  Goal: Demonstrations of improved sensory functioning will increase  Description  Demonstrations of improved sensory functioning will increase  Outcome: Ongoing  Goal: Decrease in sensory misperception frequency  Description  Decrease in sensory misperception frequency  Outcome: Ongoing  Goal: Able to refrain from responding to false sensory perceptions  Description  Able to refrain from responding to false sensory perceptions  Outcome: Ongoing  Goal: Demonstrates accurate environmental perceptions  Description  Demonstrates accurate environmental perceptions  Outcome: Ongoing  Goal: Able to distinguish between reality-based and nonreality-based thinking  Description  Able to distinguish between reality-based and nonreality-based thinking  Outcome: Ongoing  Goal: Able to interrupt nonreality-based thinking  Description  Able to interrupt nonreality-based thinking  Outcome: Ongoing     Problem: Sleep Pattern Disturbance:  Goal: Appears well-rested  Description  Appears well-rested  Outcome: Ongoing

## 2020-03-05 NOTE — TELEPHONE ENCOUNTER
[facility] 45 Transitions Initial Follow Up Call    Outreach made within 2 business days of discharge: Yes    Patient: Susan Lovelace Patient : 1935   MRN: [id number]  Reason for Admission: There are no discharge diagnoses documented for the most recent discharge. Discharge Date: 3/4/20       Spoke with: Left message for patient to return call at convenience regarding hospital discharge. Scheduled appointment with PCP within 7-14 days    Follow Up  No future appointments.     Brock Anderson

## 2020-03-06 PROBLEM — Z51.5 HOSPICE CARE PATIENT: Status: ACTIVE | Noted: 2020-01-01
